# Patient Record
Sex: MALE | Race: OTHER | NOT HISPANIC OR LATINO
[De-identification: names, ages, dates, MRNs, and addresses within clinical notes are randomized per-mention and may not be internally consistent; named-entity substitution may affect disease eponyms.]

---

## 2017-06-26 PROBLEM — Z00.00 ENCOUNTER FOR PREVENTIVE HEALTH EXAMINATION: Status: ACTIVE | Noted: 2017-06-26

## 2017-07-05 ENCOUNTER — LABORATORY RESULT (OUTPATIENT)
Age: 57
End: 2017-07-05

## 2017-07-05 ENCOUNTER — APPOINTMENT (OUTPATIENT)
Dept: NEUROLOGY | Facility: CLINIC | Age: 57
End: 2017-07-05
Payer: COMMERCIAL

## 2017-07-05 VITALS
DIASTOLIC BLOOD PRESSURE: 90 MMHG | SYSTOLIC BLOOD PRESSURE: 154 MMHG | WEIGHT: 178 LBS | OXYGEN SATURATION: 97 % | TEMPERATURE: 97.6 F | BODY MASS INDEX: 24.92 KG/M2 | HEART RATE: 86 BPM | HEIGHT: 71 IN

## 2017-07-05 VITALS — DIASTOLIC BLOOD PRESSURE: 91 MMHG | SYSTOLIC BLOOD PRESSURE: 133 MMHG

## 2017-07-05 VITALS — SYSTOLIC BLOOD PRESSURE: 128 MMHG | DIASTOLIC BLOOD PRESSURE: 80 MMHG

## 2017-07-05 PROCEDURE — 99205 OFFICE O/P NEW HI 60 MIN: CPT

## 2017-07-10 ENCOUNTER — INPATIENT (INPATIENT)
Facility: HOSPITAL | Age: 57
LOS: 3 days | Discharge: HOME | DRG: 98 | End: 2017-07-14
Attending: PSYCHIATRY & NEUROLOGY | Admitting: PSYCHIATRY & NEUROLOGY
Payer: COMMERCIAL

## 2017-07-10 VITALS
HEART RATE: 82 BPM | SYSTOLIC BLOOD PRESSURE: 149 MMHG | OXYGEN SATURATION: 98 % | TEMPERATURE: 98 F | HEIGHT: 71 IN | WEIGHT: 179.68 LBS | RESPIRATION RATE: 17 BRPM | DIASTOLIC BLOOD PRESSURE: 73 MMHG

## 2017-07-10 DIAGNOSIS — G04.90 ENCEPHALITIS AND ENCEPHALOMYELITIS, UNSPECIFIED: ICD-10-CM

## 2017-07-10 DIAGNOSIS — R63.8 OTHER SYMPTOMS AND SIGNS CONCERNING FOOD AND FLUID INTAKE: ICD-10-CM

## 2017-07-10 DIAGNOSIS — S46.119A STRAIN OF MUSCLE, FASCIA AND TENDON OF LONG HEAD OF BICEPS, UNSPECIFIED ARM, INITIAL ENCOUNTER: Chronic | ICD-10-CM

## 2017-07-10 DIAGNOSIS — Z29.9 ENCOUNTER FOR PROPHYLACTIC MEASURES, UNSPECIFIED: ICD-10-CM

## 2017-07-10 DIAGNOSIS — A69.20 LYME DISEASE, UNSPECIFIED: ICD-10-CM

## 2017-07-10 PROCEDURE — 95951: CPT | Mod: 26

## 2017-07-10 PROCEDURE — 99223 1ST HOSP IP/OBS HIGH 75: CPT

## 2017-07-10 NOTE — H&P ADULT - REASON FOR ADMISSION
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

## 2017-07-10 NOTE — H&P ADULT - ASSESSMENT
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 Pt is admitted for VEEG monitoring and IV solumedrol

## 2017-07-10 NOTE — H&P ADULT - HISTORY OF PRESENT ILLNESS
55 yo M with no significant PMH admitted for VEEG monitoring and IV steroids. Pt states that he had a tick bite around february. Back then he noticed a "bull's eye rash" on his chest at the site where he removed the tick. He went to his PCP who prescribed him some penicillin. he completed the course of antibiotics. The following few months, he felt that his "psychi" was off, feeling paranoid and anxious. He did not experience any visual or auditory hallucinations.   Per outpatient record, pt felt that people were watching or following him. He had told his wife he thought someone might be coming to kill him. His PMD started him on zoloft, but 3d later, he became 'insane', much worse, with some aggressiveness. In early May, pt was admitted involuntarily to [facility], received Haldol, Seroquel and Lorazepam throughout his stay. Dystonic movements were noted by the neurologist, who sent for VGKC autoantibody titer, which came back positive at 0.19 (n<0.02).  On review of system, pt denies any headaches, changes in vision, facial paralysis, SOB, heart palpitations, muscle weakness, paresthesia, nausea, vomiting or diarrhea.     Outpatient labs from 5/7/2017 were notable for iron deficiency anemia WBC: 6.35, H/H 12.6/39.7, Plt 285; Ferritin 11.0, RDW 15.5, MCV 71.4. BMP was unremarkable 141/4.3/101/24/17/1.21/103, with mild transaminitis ST/ALT 58/196, elevated CRP (1.2) and ESR (25), negative Hep B, C and HIV panel, negative NMDA and glutamic acid carboxylase antibody, negative anti-SSa, ant-SSb antibodies for Srogjen, negative DANIELLE, normal C3 (139) and C4 (32). 57 yo M with no significant PMH admitted for VEEG monitoring and IV steroids. Pt states that he had a tick bite around february. Back then he noticed a "bull's eye rash" on his chest at the site where he removed the tick. He went to his PCP who prescribed him some penicillin. he completed the course of antibiotics. The following few months, he felt that his "psychi" was off, feeling paranoid and anxious. He did not experience any visual or auditory hallucinations.   Per outpatient record, pt felt that people were watching or following him. He had told his wife he thought someone might be coming to kill him. His PMD started him on zoloft, but 3d later, he became 'insane', much worse, with some aggressiveness. In early May, pt was admitted involuntarily to [facility], received Haldol, Seroquel and Lorazepam throughout his stay. Dystonic movements were noted by the neurologist, who sent for VGKC autoantibody titer, which came back positive at 0.19 (n<0.02).  On review of system, pt denies any headaches, changes in vision, facial paralysis, SOB, heart palpitations, muscle weakness, paresthesia, nausea, vomiting or diarrhea.     Outpatient labs from 5/7/2017 were notable for positive Lyme IgG/IgM antibodies, elevated CRP (1.2) and ESR (25), and iron deficiency anemia WBC: 6.35, H/H 12.6/39.7, Plt 285; Ferritin 11.0, RDW 15.5, MCV 71.4. BMP was unremarkable Na/K/Cl/HCO3/BUN/Crt/Glu 141/4.3/101/24/17/1.21/103, with mild transaminitis ST/ALT 58/196, negative Hep B, C and HIV panel, negative NMDA and glutamic acid carboxylase antibody, negative anti-SSa, ant-SSb antibodies for Srogjen, negative DANIELLE, normal C3 (139) and C4 (32). Thyroid function was normal TSH 1.30 with negative anti-thryoid peroxidase antibody.

## 2017-07-10 NOTE — H&P ADULT - NSHPSOCIALHISTORY_GEN_ALL_CORE
and lives with his wife. Social drinker. No current or past tobacco use. No current or past illicit drug use

## 2017-07-10 NOTE — H&P ADULT - PROBLEM SELECTOR PLAN 1
Limbic encephalitis associated with VGKC antibody.  -VEEG monitoring  -Ativan 2 mg Q4 hour PRN for seizures >2 minutes  -IV solumedrol 250 mg Q6 for 5 days

## 2017-07-10 NOTE — H&P ADULT - NSHPREVIEWOFSYSTEMS_GEN_ALL_CORE
CV: Denies chest pain, palpitations  Resp: Denies SOB  GI: Denies abdominal pain, constipation, diarrhea, nausea, vomiting  : Denies dysuria, hematuria, flank or back pain  ID: Denies fevers, chills  MSK: Denies joint pain   DERM: Denies any rashes, pruritus, bruises   PSYCH: Endorses mood changes, feelings of paranoia and anxiety.

## 2017-07-11 LAB
25(OH)D3 SERPL-MCNC: 33.6 NG/ML
A-TUMOR NECROSIS FACT SERPL-MCNC: 20 PG/ML
ADJUSTED MITOGEN: 4.08 IU/ML
ADJUSTED TB AG: 0 IU/ML
ALBUMIN MFR SERPL ELPH: 57.6 %
ALBUMIN SERPL ELPH-MCNC: 4.7 G/DL
ALBUMIN SERPL-MCNC: 4.5 G/DL
ALBUMIN/GLOB SERPL: 1.4 RATIO
ALP BLD-CCNC: 196 U/L
ALPHA1 GLOB MFR SERPL ELPH: 4.9 %
ALPHA1 GLOB SERPL ELPH-MCNC: 0.4 G/DL
ALPHA2 GLOB MFR SERPL ELPH: 10.5 %
ALPHA2 GLOB SERPL ELPH-MCNC: 0.8 G/DL
ALT SERPL-CCNC: 58 U/L
ANA SER IF-ACNC: NEGATIVE
ANION GAP SERPL CALC-SCNC: 16 MMOL/L
ANION GAP SERPL CALC-SCNC: 16 MMOL/L — SIGNIFICANT CHANGE UP (ref 5–17)
APPEARANCE CSF: CLEAR — SIGNIFICANT CHANGE UP
AST SERPL-CCNC: 30 U/L
B BURGDOR AB SER-IMP: POSITIVE
B BURGDOR IGG+IGM SER QL IB: NORMAL
B BURGDOR IGM PATRN SER IB-IMP: POSITIVE
B BURGDOR18/20KD IGM SER QL IB: NORMAL
B BURGDOR18KD IGG SER QL IB: PRESENT
B BURGDOR23KD IGG SER QL IB: PRESENT
B BURGDOR23KD IGM SER QL IB: PRESENT
B BURGDOR28KD AB SER QL IB: NORMAL
B BURGDOR28KD IGG SER QL IB: NORMAL
B BURGDOR30KD AB SER QL IB: NORMAL
B BURGDOR30KD IGG SER QL IB: NORMAL
B BURGDOR31KD IGG SER QL IB: NORMAL
B BURGDOR31KD IGM SER QL IB: NORMAL
B BURGDOR39KD IGG SER QL IB: PRESENT
B BURGDOR39KD IGM SER QL IB: PRESENT
B BURGDOR41KD IGG SER QL IB: PRESENT
B BURGDOR41KD IGM SER QL IB: PRESENT
B BURGDOR45KD AB SER QL IB: NORMAL
B BURGDOR45KD IGG SER QL IB: PRESENT
B BURGDOR58KD AB SER QL IB: NORMAL
B BURGDOR58KD IGG SER QL IB: PRESENT
B BURGDOR66KD IGG SER QL IB: NORMAL
B BURGDOR66KD IGM SER QL IB: NORMAL
B BURGDOR93KD IGG SER QL IB: PRESENT
B BURGDOR93KD IGM SER QL IB: NORMAL
B-GLOBULIN MFR SERPL ELPH: 13.9 %
B-GLOBULIN SERPL ELPH-MCNC: 1.1 G/DL
BASOPHILS # BLD AUTO: 0.03 K/UL
BASOPHILS NFR BLD AUTO: 0.5 %
BILIRUB SERPL-MCNC: 0.4 MG/DL
BUN SERPL-MCNC: 15 MG/DL — SIGNIFICANT CHANGE UP (ref 7–23)
BUN SERPL-MCNC: 17 MG/DL
C3 SERPL-MCNC: 139 MG/DL
C4 SERPL-MCNC: 32 MG/DL
CALCIUM SERPL-MCNC: 9.5 MG/DL
CALCIUM SERPL-MCNC: 9.9 MG/DL — SIGNIFICANT CHANGE UP (ref 8.4–10.5)
CHLORIDE SERPL-SCNC: 101 MMOL/L
CHLORIDE SERPL-SCNC: 97 MMOL/L — SIGNIFICANT CHANGE UP (ref 96–108)
CO2 SERPL-SCNC: 22 MMOL/L — SIGNIFICANT CHANGE UP (ref 22–31)
CO2 SERPL-SCNC: 24 MMOL/L
COLOR CSF: SIGNIFICANT CHANGE UP
CREAT SERPL-MCNC: 0.9 MG/DL — SIGNIFICANT CHANGE UP (ref 0.5–1.3)
CREAT SERPL-MCNC: 1.21 MG/DL
CRP SERPL-MCNC: 1.2 MG/DL
ENA SS-A AB SER IA-ACNC: <0.2 AL
ENA SS-B AB SER IA-ACNC: <0.2 AL
EOSINOPHIL # BLD AUTO: 0.07 K/UL
EOSINOPHIL NFR BLD AUTO: 1.1 %
ERYTHROCYTE [SEDIMENTATION RATE] IN BLOOD BY WESTERGREN METHOD: 25 MM/HR
FERRITIN SERPL-MCNC: 11 NG/ML
GAD65 AB SER-MCNC: 0 NMOL/L
GAMMA GLOB FLD ELPH-MCNC: 1 G/DL
GAMMA GLOB MFR SERPL ELPH: 13.1 %
GLUCOSE CSF-MCNC: 101 MG/DL — HIGH (ref 40–70)
GLUCOSE SERPL-MCNC: 103 MG/DL
GLUCOSE SERPL-MCNC: 208 MG/DL — HIGH (ref 70–99)
HBV CORE IGG+IGM SER QL: NONREACTIVE
HBV SURFACE AB SER QL: NONREACTIVE
HCT VFR BLD CALC: 39.7 %
HCT VFR BLD CALC: 39.8 % — SIGNIFICANT CHANGE UP (ref 39–50)
HCV AB SER QL: NONREACTIVE
HCV S/CO RATIO: 0.31 S/CO
HGB BLD-MCNC: 12.6 G/DL
HGB BLD-MCNC: 12.7 G/DL — LOW (ref 13–17)
HIV1+2 AB SPEC QL IA.RAPID: NONREACTIVE
IL10 SERPL-MCNC: 12 PG/ML
IL12 SERPL-MCNC: 6 PG/ML
IL13 SERPL-MCNC: 30 PG/ML
IL2 SERPL-MCNC: 706 PG/ML
IL2 SERPL-MCNC: 8 PG/ML
IL4 SERPL-MCNC: <5 PG/ML
IL6 SERPL-MCNC: <5 PG/ML
IL8 SERPL-MCNC: <5 PG/ML
IMM GRANULOCYTES NFR BLD AUTO: 0.3 %
INTERFERON GAMMA: 8 PG/ML
INTERLEUKIN 1 BETA: 9 PG/ML
INTERLEUKIN 17: <5 PG/ML
INTERLEUKIN 5: <5 PG/ML
INTERPRETATION SERPL IEP-IMP: NORMAL
JC VIRUS DNA, WHOLE BLOOD, PCR: NEGATIVE
LYMPHOCYTES # BLD AUTO: 1.12 K/UL
LYMPHOCYTES # CSF: 8 % — LOW (ref 40–80)
LYMPHOCYTES NFR BLD AUTO: 17.6 %
M PROTEIN SPEC IFE-MCNC: NORMAL
M TB IFN-G BLD-IMP: NORMAL
MAGNESIUM SERPL-MCNC: 2.1 MG/DL — SIGNIFICANT CHANGE UP (ref 1.6–2.6)
MAN DIFF?: NORMAL
MCHC RBC-ENTMCNC: 22.1 PG — LOW (ref 27–34)
MCHC RBC-ENTMCNC: 22.7 PG
MCHC RBC-ENTMCNC: 31.7 GM/DL
MCHC RBC-ENTMCNC: 31.9 G/DL — LOW (ref 32–36)
MCV RBC AUTO: 69.2 FL — LOW (ref 80–100)
MCV RBC AUTO: 71.4 FL
MONOCYTES # BLD AUTO: 0.57 K/UL
MONOCYTES NFR BLD AUTO: 9 %
MONOS+MACROS NFR CSF: 1 % — LOW (ref 15–45)
NEUTROPHILS # BLD AUTO: 4.54 K/UL
NEUTROPHILS # CSF: 0 % — SIGNIFICANT CHANGE UP (ref 0–6)
NEUTROPHILS NFR BLD AUTO: 71.5 %
NMDA RECEPTOR AB N-METHYL-D-ASPARTATE RECEPTOR AB IGG, SERUM WITH REFLEX TO TITER: NORMAL
NRBC NFR CSF: 9 /UL — HIGH (ref 0–5)
PLATELET # BLD AUTO: 285 K/UL
PLATELET # BLD AUTO: 402 K/UL — HIGH (ref 150–400)
POTASSIUM SERPL-MCNC: 4.3 MMOL/L — SIGNIFICANT CHANGE UP (ref 3.5–5.3)
POTASSIUM SERPL-SCNC: 4.3 MMOL/L
POTASSIUM SERPL-SCNC: 4.3 MMOL/L — SIGNIFICANT CHANGE UP (ref 3.5–5.3)
PROT CSF-MCNC: 48 MG/DL — HIGH (ref 15–45)
PROT SERPL-MCNC: 7.8 G/DL
QUANTIFERON GOLD NIL: 0.02 IU/ML
RBC # BLD: 5.56 M/UL
RBC # BLD: 5.75 M/UL — SIGNIFICANT CHANGE UP (ref 4.2–5.8)
RBC # CSF: 0 /UL — SIGNIFICANT CHANGE UP (ref 0–0)
RBC # FLD: 15.1 % — SIGNIFICANT CHANGE UP (ref 10.3–16.9)
RBC # FLD: 15.5 %
SODIUM SERPL-SCNC: 135 MMOL/L — SIGNIFICANT CHANGE UP (ref 135–145)
SODIUM SERPL-SCNC: 141 MMOL/L
THYROGLOB AB SERPL-ACNC: <20 IU/ML
THYROPEROXIDASE AB SERPL IA-ACNC: <10 IU/ML
TSH SERPL-ACNC: 1.3 UIU/ML
TUBE TYPE: SIGNIFICANT CHANGE UP
WBC # BLD: 4 K/UL — SIGNIFICANT CHANGE UP (ref 3.8–10.5)
WBC # FLD AUTO: 4 K/UL — SIGNIFICANT CHANGE UP (ref 3.8–10.5)
WBC # FLD AUTO: 6.35 K/UL

## 2017-07-11 PROCEDURE — 96118: CPT

## 2017-07-11 PROCEDURE — 90791 PSYCH DIAGNOSTIC EVALUATION: CPT

## 2017-07-11 PROCEDURE — 95951: CPT | Mod: 26,52

## 2017-07-11 PROCEDURE — 62270 DX LMBR SPI PNXR: CPT | Mod: GC

## 2017-07-11 PROCEDURE — 99232 SBSQ HOSP IP/OBS MODERATE 35: CPT

## 2017-07-11 RX ORDER — DEXTROSE 50 % IN WATER 50 %
25 SYRINGE (ML) INTRAVENOUS ONCE
Qty: 0 | Refills: 0 | Status: DISCONTINUED | OUTPATIENT
Start: 2017-07-11 | End: 2017-07-14

## 2017-07-11 RX ORDER — SODIUM CHLORIDE 9 MG/ML
1000 INJECTION, SOLUTION INTRAVENOUS
Qty: 0 | Refills: 0 | Status: DISCONTINUED | OUTPATIENT
Start: 2017-07-11 | End: 2017-07-14

## 2017-07-11 RX ORDER — FAMOTIDINE 10 MG/ML
20 INJECTION INTRAVENOUS DAILY
Qty: 0 | Refills: 0 | Status: DISCONTINUED | OUTPATIENT
Start: 2017-07-11 | End: 2017-07-11

## 2017-07-11 RX ORDER — GLUCAGON INJECTION, SOLUTION 0.5 MG/.1ML
1 INJECTION, SOLUTION SUBCUTANEOUS ONCE
Qty: 0 | Refills: 0 | Status: DISCONTINUED | OUTPATIENT
Start: 2017-07-11 | End: 2017-07-14

## 2017-07-11 RX ORDER — DEXTROSE 50 % IN WATER 50 %
12.5 SYRINGE (ML) INTRAVENOUS ONCE
Qty: 0 | Refills: 0 | Status: DISCONTINUED | OUTPATIENT
Start: 2017-07-11 | End: 2017-07-14

## 2017-07-11 RX ORDER — DEXTROSE 50 % IN WATER 50 %
1 SYRINGE (ML) INTRAVENOUS ONCE
Qty: 0 | Refills: 0 | Status: DISCONTINUED | OUTPATIENT
Start: 2017-07-11 | End: 2017-07-14

## 2017-07-11 RX ORDER — INSULIN LISPRO 100/ML
VIAL (ML) SUBCUTANEOUS
Qty: 0 | Refills: 0 | Status: DISCONTINUED | OUTPATIENT
Start: 2017-07-11 | End: 2017-07-14

## 2017-07-11 RX ORDER — PANTOPRAZOLE SODIUM 20 MG/1
40 TABLET, DELAYED RELEASE ORAL
Qty: 0 | Refills: 0 | Status: DISCONTINUED | OUTPATIENT
Start: 2017-07-11 | End: 2017-07-14

## 2017-07-11 RX ORDER — LANOLIN ALCOHOL/MO/W.PET/CERES
3 CREAM (GRAM) TOPICAL ONCE
Qty: 0 | Refills: 0 | Status: COMPLETED | OUTPATIENT
Start: 2017-07-11 | End: 2017-07-11

## 2017-07-11 RX ADMIN — Medication 50 MILLIGRAM(S): at 19:06

## 2017-07-11 RX ADMIN — PANTOPRAZOLE SODIUM 40 MILLIGRAM(S): 20 TABLET, DELAYED RELEASE ORAL at 13:04

## 2017-07-11 RX ADMIN — Medication 50 MILLIGRAM(S): at 06:17

## 2017-07-11 RX ADMIN — Medication 100 MILLIGRAM(S): at 19:06

## 2017-07-11 RX ADMIN — Medication 50 MILLIGRAM(S): at 23:33

## 2017-07-11 RX ADMIN — Medication 2: at 23:32

## 2017-07-11 RX ADMIN — Medication 4: at 19:05

## 2017-07-11 RX ADMIN — Medication 3 MILLIGRAM(S): at 23:33

## 2017-07-11 RX ADMIN — Medication 100 MILLIGRAM(S): at 06:17

## 2017-07-11 RX ADMIN — Medication 2: at 13:04

## 2017-07-11 RX ADMIN — Medication 50 MILLIGRAM(S): at 00:10

## 2017-07-11 RX ADMIN — Medication 50 MILLIGRAM(S): at 13:04

## 2017-07-11 NOTE — PROGRESS NOTE ADULT - SUBJECTIVE AND OBJECTIVE BOX
INTERVAL HPI/OVERNIGHT EVENTS:  Patient S&E at bedside. No overnight events, patient resting comfortably. No complaints at this time. Patient denies fever, chills, dizziness, weakness, CP, palpitations, SOB, cough, N/V/D/C, dysuria, changes in bowel movements, LE edema. Denies anxiety, paranoia or other psychiatric complaints currently or in the past several weeks.    VITAL SIGNS:  T(F): 98.1 (07-11-17 @ 05:20)  HR: 63 (07-11-17 @ 05:20)  BP: 126/70 (07-11-17 @ 05:20)  RR: 15 (07-11-17 @ 05:20)  SpO2: 99% (07-11-17 @ 05:20)    PHYSICAL EXAM:    Constitutional: WDWN, NAD  Eyes: PERRL, EOMI, sclera non-icteric  Neck: supple, no masses, no JVD  Respiratory: CTA b/l, good air entry b/l, no wheezing, rhonchi, rales, with normal respiratory effort and no intercostal retractions  Cardiovascular: RRR, normal S1S2, no M/R/G  Gastrointestinal: soft, NTND, no masses palpable, BS normal in all four quadrants, no HSM  Extremities: WWP  Neurological: AAOx3  Skin: Normal temperature    MEDICATIONS  (STANDING):  doxycycline hyclate Capsule 100 milliGRAM(s) Oral every 12 hours  methylPREDNISolone sodium succinate IVPB 250 milliGRAM(s) IV Intermittent every 6 hours  melatonin 3 milliGRAM(s) Oral once  insulin lispro (HumaLOG) corrective regimen sliding scale   SubCutaneous Before meals and at bedtime  dextrose 5%. 1000 milliLiter(s) (50 mL/Hr) IV Continuous <Continuous>  dextrose 50% Injectable 12.5 Gram(s) IV Push once  dextrose 50% Injectable 25 Gram(s) IV Push once  dextrose 50% Injectable 25 Gram(s) IV Push once  pantoprazole    Tablet 40 milliGRAM(s) Oral before breakfast    MEDICATIONS  (PRN):  LORazepam    IVPB 2 milliGRAM(s) IV Intermittent every 4 hours PRN Seizure  dextrose Gel 1 Dose(s) Oral once PRN Blood Glucose LESS THAN 70 milliGRAM(s)/deciliter  glucagon  Injectable 1 milliGRAM(s) IntraMuscular once PRN Glucose LESS THAN 70 milligrams/deciliter      Allergies    No Known Allergies    Intolerances        LABS:       RADIOLOGY & ADDITIONAL TESTS:  Studies reviewed. INTERVAL HPI/OVERNIGHT EVENTS:  Patient S&E at bedside. No overnight events, patient resting comfortably. No complaints at this time. Patient denies fever, chills, dizziness, weakness, CP, palpitations, SOB, cough, N/V/D/C, dysuria, changes in bowel movements, LE edema. Denies anxiety, paranoia or other psychiatric complaints currently or in the past several weeks.    VITAL SIGNS:  T(F): 98.1 (07-11-17 @ 05:20)  HR: 63 (07-11-17 @ 05:20)  BP: 126/70 (07-11-17 @ 05:20)  RR: 15 (07-11-17 @ 05:20)  SpO2: 99% (07-11-17 @ 05:20)    PHYSICAL EXAM:    Constitutional: WDWN, NAD  Eyes: PERRL, EOMI, sclera non-icteric  Neck: supple, no masses, no JVD  Respiratory: CTA b/l, good air entry b/l, no wheezing, rhonchi, rales, with normal respiratory effort and no intercostal retractions  Cardiovascular: RRR, normal S1S2, no M/R/G  Gastrointestinal: soft, NTND, no masses palpable, BS normal in all four quadrants, no HSM  Extremities: WWP  Skin: Normal temperature  Neurological:  AAOx3, speech fluent, no dysarthria, naming and repetition intact, follows 3 step commands  PERRL, EOMI, VFFC, face symmetric, tongue midline  5/5 x 4  LT intact b/l  DTR symmetric  FTN intact b/l      MEDICATIONS  (STANDING):  doxycycline hyclate Capsule 100 milliGRAM(s) Oral every 12 hours  methylPREDNISolone sodium succinate IVPB 250 milliGRAM(s) IV Intermittent every 6 hours  melatonin 3 milliGRAM(s) Oral once  insulin lispro (HumaLOG) corrective regimen sliding scale   SubCutaneous Before meals and at bedtime  dextrose 5%. 1000 milliLiter(s) (50 mL/Hr) IV Continuous <Continuous>  dextrose 50% Injectable 12.5 Gram(s) IV Push once  dextrose 50% Injectable 25 Gram(s) IV Push once  dextrose 50% Injectable 25 Gram(s) IV Push once  pantoprazole    Tablet 40 milliGRAM(s) Oral before breakfast    MEDICATIONS  (PRN):  LORazepam    IVPB 2 milliGRAM(s) IV Intermittent every 4 hours PRN Seizure  dextrose Gel 1 Dose(s) Oral once PRN Blood Glucose LESS THAN 70 milliGRAM(s)/deciliter  glucagon  Injectable 1 milliGRAM(s) IntraMuscular once PRN Glucose LESS THAN 70 milligrams/deciliter      Allergies    No Known Allergies    Intolerances        VEEG (Prelim) normal wakefulness, drowsiness and sleep.      RADIOLOGY & ADDITIONAL TESTS:  Studies reviewed.

## 2017-07-11 NOTE — PROGRESS NOTE ADULT - ASSESSMENT
55 yo male with no significant PMH found to have anti-VGKC antibodies and positive lyme serology at outpatient neurologist during workup for new onset psychiatric disturbances, admitted for VEEG monitoring and IV solumedrol. 57 yo male with no significant PMH found to have anti-VGKC antibodies and positive lyme serology at outpatient neurologist during workup for new onset psychiatric disturbances, admitted for VEEG monitoring to r/o subclinical seizures and IV solumedrol for treatment of presumed encephalitis.

## 2017-07-11 NOTE — CONSULT NOTE ADULT - SUBJECTIVE AND OBJECTIVE BOX
The patient was seen at bedside.  I conducted a clinical interview and reviewed all available medical records.  A portion of the neuropsychological testing was conducted.  The patient was alert and cooperative.   A full report with clinical recommendations will follow as a scanned note once testing is completed.   1 hour clinical interview, clinical assessment of the patient, collateral interview, test selection,   2 hours face-to-face testing  2 hours detailed review and analysis of all test data, integration of information obtained from other sources, formulation and generation of report

## 2017-07-11 NOTE — PROGRESS NOTE ADULT - PROBLEM SELECTOR PLAN 1
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  -VEEG monitoring per Dr. Dupree (Neurology)  -Ativan 2 mg Q4 hour PRN for seizures >2 minutes  -IV solumedrol 250 mg Q6 for 5 days, on day 1/5  -ISS given steroid injections Limbic encephalitis associated with positive VGKC antibody.  -VEEG monitoring per Dr. Dupree (Neurology)  -Ativan 2 mg IVP PRN for seizures >2 minutes  -IV solumedrol 250 mg Q6 for 5 days, on day 2/5  -ISS given steroid injections  - GI ppx  - LP today to evaluate for evidence of active CNS inflammation/check VGKC titers: Protein, glucose, cell count, oligoclonal bands, IgG index, paraneoplastic panel, VGKC ab  - Check serum oligoclonal bands today as well.

## 2017-07-12 LAB
ANION GAP SERPL CALC-SCNC: 15 MMOL/L — SIGNIFICANT CHANGE UP (ref 5–17)
BUN SERPL-MCNC: 17 MG/DL — SIGNIFICANT CHANGE UP (ref 7–23)
CALCIUM SERPL-MCNC: 9.7 MG/DL — SIGNIFICANT CHANGE UP (ref 8.4–10.5)
CHLORIDE SERPL-SCNC: 99 MMOL/L — SIGNIFICANT CHANGE UP (ref 96–108)
CO2 SERPL-SCNC: 23 MMOL/L — SIGNIFICANT CHANGE UP (ref 22–31)
CREAT SERPL-MCNC: 0.9 MG/DL — SIGNIFICANT CHANGE UP (ref 0.5–1.3)
GLUCOSE SERPL-MCNC: 163 MG/DL — HIGH (ref 70–99)
HCT VFR BLD CALC: 38 % — LOW (ref 39–50)
HGB BLD-MCNC: 12.4 G/DL — LOW (ref 13–17)
MAGNESIUM SERPL-MCNC: 2.3 MG/DL — SIGNIFICANT CHANGE UP (ref 1.6–2.6)
MCHC RBC-ENTMCNC: 22.6 PG — LOW (ref 27–34)
MCHC RBC-ENTMCNC: 32.6 G/DL — SIGNIFICANT CHANGE UP (ref 32–36)
MCV RBC AUTO: 69.3 FL — LOW (ref 80–100)
PLATELET # BLD AUTO: 442 K/UL — HIGH (ref 150–400)
POTASSIUM SERPL-MCNC: 4.3 MMOL/L — SIGNIFICANT CHANGE UP (ref 3.5–5.3)
POTASSIUM SERPL-SCNC: 4.3 MMOL/L — SIGNIFICANT CHANGE UP (ref 3.5–5.3)
RBC # BLD: 5.48 M/UL — SIGNIFICANT CHANGE UP (ref 4.2–5.8)
RBC # FLD: 15.2 % — SIGNIFICANT CHANGE UP (ref 10.3–16.9)
SODIUM SERPL-SCNC: 137 MMOL/L — SIGNIFICANT CHANGE UP (ref 135–145)
WBC # BLD: 11.6 K/UL — HIGH (ref 3.8–10.5)
WBC # FLD AUTO: 11.6 K/UL — HIGH (ref 3.8–10.5)

## 2017-07-12 PROCEDURE — 99232 SBSQ HOSP IP/OBS MODERATE 35: CPT

## 2017-07-12 RX ORDER — ACETAMINOPHEN 500 MG
650 TABLET ORAL EVERY 6 HOURS
Qty: 0 | Refills: 0 | Status: DISCONTINUED | OUTPATIENT
Start: 2017-07-12 | End: 2017-07-14

## 2017-07-12 RX ADMIN — Medication 2: at 21:14

## 2017-07-12 RX ADMIN — Medication 650 MILLIGRAM(S): at 08:56

## 2017-07-12 RX ADMIN — Medication 50 MILLIGRAM(S): at 17:53

## 2017-07-12 RX ADMIN — Medication 100 MILLIGRAM(S): at 17:53

## 2017-07-12 RX ADMIN — Medication 100 MILLIGRAM(S): at 06:53

## 2017-07-12 RX ADMIN — Medication 50 MILLIGRAM(S): at 13:07

## 2017-07-12 RX ADMIN — Medication 2: at 17:53

## 2017-07-12 RX ADMIN — PANTOPRAZOLE SODIUM 40 MILLIGRAM(S): 20 TABLET, DELAYED RELEASE ORAL at 06:53

## 2017-07-12 RX ADMIN — Medication 50 MILLIGRAM(S): at 06:53

## 2017-07-12 RX ADMIN — Medication 2: at 08:51

## 2017-07-12 NOTE — PROGRESS NOTE ADULT - SUBJECTIVE AND OBJECTIVE BOX
INTERVAL HPI/OVERNIGHT EVENTS:  Patient S&E at bedside. No overnight events, patient resting comfortably. Reported mild headache after lumbar puncture yesterday which resolved, but complains of mild headache this morning which he thinks is 2/2 EEG leads -- received tylenol x1. Patient denies fever, chills, dizziness, weakness, CP, palpitations, SOB, cough, N/V/D/C, dysuria, changes in bowel movements, LE edema, neurologic symptoms (numbness, tingling, weakness).    VITAL SIGNS:  T(F): 98.1 (07-11-17 @ 05:20)  HR: 63 (07-11-17 @ 05:20)  BP: 126/70 (07-11-17 @ 05:20)  RR: 15 (07-11-17 @ 05:20)  SpO2: 99% (07-11-17 @ 05:20)    PHYSICAL EXAM:    Constitutional: WDWN, NAD  Eyes: PERRL, EOMI, sclera non-icteric  Neck: supple  Respiratory: CTA b/l, good air entry b/l, no wheezing, rhonchi, rales, with normal respiratory effort and no intercostal retractions  Cardiovascular: RRR, normal S1S2, no M/R/G  Gastrointestinal: soft, NTND, no masses palpable, BS normal in all four quadrants, no HSM  Extremities: WWP  Skin: Normal temperature  Neurological:  AAOx3, speech fluent, no dysarthria, naming and repetition intact, follows 3 step commands  PERRL, EOMI, VFFC, face symmetric, tongue midline  5/5 x 4  LT intact b/l  DTR symmetric  FTN intact b/l      MEDICATIONS  (STANDING):  doxycycline hyclate Capsule 100 milliGRAM(s) Oral every 12 hours  methylPREDNISolone sodium succinate IVPB 250 milliGRAM(s) IV Intermittent every 6 hours  melatonin 3 milliGRAM(s) Oral once  insulin lispro (HumaLOG) corrective regimen sliding scale   SubCutaneous Before meals and at bedtime  dextrose 5%. 1000 milliLiter(s) (50 mL/Hr) IV Continuous <Continuous>  dextrose 50% Injectable 12.5 Gram(s) IV Push once  dextrose 50% Injectable 25 Gram(s) IV Push once  dextrose 50% Injectable 25 Gram(s) IV Push once  pantoprazole    Tablet 40 milliGRAM(s) Oral before breakfast    MEDICATIONS  (PRN):  LORazepam    IVPB 2 milliGRAM(s) IV Intermittent every 4 hours PRN Seizure  dextrose Gel 1 Dose(s) Oral once PRN Blood Glucose LESS THAN 70 milliGRAM(s)/deciliter  glucagon  Injectable 1 milliGRAM(s) IntraMuscular once PRN Glucose LESS THAN 70 milligrams/deciliter      Allergies    No Known Allergies    Intolerances      VEEG (Prelim) normal wakefulness, drowsiness and sleep.      RADIOLOGY & ADDITIONAL TESTS:  Studies reviewed. INTERVAL HPI/OVERNIGHT EVENTS:  Patient S&E at bedside. No overnight events, patient resting comfortably. Reported mild headache after lumbar puncture yesterday which resolved, but complains of mild headache this morning which he thinks is 2/2 EEG leads -- received tylenol x1 with resolution. Patient denies fever, chills, dizziness, weakness, CP, palpitations, SOB, cough, N/V/D/C, dysuria, changes in bowel movements, LE edema, neurologic symptoms (numbness, tingling, weakness). Has been OOB.    VITAL SIGNS:  T(F): 98.1 (07-11-17 @ 05:20)  HR: 63 (07-11-17 @ 05:20)  BP: 126/70 (07-11-17 @ 05:20)  RR: 15 (07-11-17 @ 05:20)  SpO2: 99% (07-11-17 @ 05:20)    PHYSICAL EXAM:    Constitutional: WDWN, NAD  Eyes: PERRL, EOMI, sclera non-icteric  Neck: supple  Respiratory: CTA b/l, good air entry b/l, no wheezing, rhonchi, rales, with normal respiratory effort and no intercostal retractions  Cardiovascular: RRR, normal S1S2, no M/R/G  Gastrointestinal: soft, NTND, no masses palpable, BS normal in all four quadrants, no HSM  Extremities: WWP  Skin: Normal temperature  Neurological:  AAOx3, speech fluent, no dysarthria, naming and repetition intact, follows 3 step commands  PERRL, EOMI, VFFC, face symmetric, tongue midline  5/5 x 4  LT intact b/l  DTR symmetric  FTN intact b/l      MEDICATIONS  (STANDING):  doxycycline hyclate Capsule 100 milliGRAM(s) Oral every 12 hours  methylPREDNISolone sodium succinate IVPB 250 milliGRAM(s) IV Intermittent every 6 hours  melatonin 3 milliGRAM(s) Oral once  insulin lispro (HumaLOG) corrective regimen sliding scale   SubCutaneous Before meals and at bedtime  dextrose 5%. 1000 milliLiter(s) (50 mL/Hr) IV Continuous <Continuous>  dextrose 50% Injectable 12.5 Gram(s) IV Push once  dextrose 50% Injectable 25 Gram(s) IV Push once  dextrose 50% Injectable 25 Gram(s) IV Push once  pantoprazole    Tablet 40 milliGRAM(s) Oral before breakfast    MEDICATIONS  (PRN):  LORazepam    IVPB 2 milliGRAM(s) IV Intermittent every 4 hours PRN Seizure  dextrose Gel 1 Dose(s) Oral once PRN Blood Glucose LESS THAN 70 milliGRAM(s)/deciliter  glucagon  Injectable 1 milliGRAM(s) IntraMuscular once PRN Glucose LESS THAN 70 milligrams/deciliter      Allergies    No Known Allergies    Intolerances      VEEG (Prelim) normal wakefulness, drowsiness and sleep.      RADIOLOGY & ADDITIONAL TESTS:  Studies reviewed. INTERVAL HPI/OVERNIGHT EVENTS:  Patient S&E at bedside. No overnight events, patient resting comfortably. Reported mild headache after lumbar puncture yesterday which resolved, but complains of mild headache this morning which he thinks is 2/2 EEG leads -- received tylenol x1 with resolution. Patient denies fever, chills, dizziness, weakness, CP, palpitations, SOB, cough, N/V/D/C, dysuria, changes in bowel movements, LE edema, neurologic symptoms (numbness, tingling, weakness). Has been OOB.    VITAL SIGNS:  T(F): 98.1 (07-11-17 @ 05:20)  HR: 63 (07-11-17 @ 05:20)  BP: 126/70 (07-11-17 @ 05:20)  RR: 15 (07-11-17 @ 05:20)  SpO2: 99% (07-11-17 @ 05:20)    PHYSICAL EXAM:    Constitutional: WDWN, NAD  Eyes: PERRL, EOMI, sclera non-icteric  Neck: supple  Respiratory: CTA b/l, good air entry b/l, no wheezing, rhonchi, rales, with normal respiratory effort and no intercostal retractions  Cardiovascular: RRR, normal S1S2, no M/R/G  Gastrointestinal: soft, NTND, no masses palpable, BS normal in all four quadrants, no HSM  Extremities: WWP  Skin: Normal temperature  Neurological:  AAOx3, speech fluent, no dysarthria, naming and repetition intact, follows 3 step commands  PERRL, EOMI, VFFC, face symmetric, tongue midline  5/5 x 4  LT intact b/l  DTR symmetric  FTN intact b/l      MEDICATIONS  (STANDING):  doxycycline hyclate Capsule 100 milliGRAM(s) Oral every 12 hours  methylPREDNISolone sodium succinate IVPB 250 milliGRAM(s) IV Intermittent every 6 hours  melatonin 3 milliGRAM(s) Oral once  insulin lispro (HumaLOG) corrective regimen sliding scale   SubCutaneous Before meals and at bedtime  dextrose 5%. 1000 milliLiter(s) (50 mL/Hr) IV Continuous <Continuous>  dextrose 50% Injectable 12.5 Gram(s) IV Push once  dextrose 50% Injectable 25 Gram(s) IV Push once  dextrose 50% Injectable 25 Gram(s) IV Push once  pantoprazole    Tablet 40 milliGRAM(s) Oral before breakfast    MEDICATIONS  (PRN):  LORazepam    IVPB 2 milliGRAM(s) IV Intermittent every 4 hours PRN Seizure  dextrose Gel 1 Dose(s) Oral once PRN Blood Glucose LESS THAN 70 milliGRAM(s)/deciliter  glucagon  Injectable 1 milliGRAM(s) IntraMuscular once PRN Glucose LESS THAN 70 milligrams/deciliter      Allergies    No Known Allergies    Intolerances      VEEG (Prelim) normal wakefulness, drowsiness and sleep.      RADIOLOGY & ADDITIONAL TESTS:  Studies reviewed.    VEEG: normal. no seizures, clinical events or epileptiform discharges.

## 2017-07-12 NOTE — PROGRESS NOTE ADULT - PROBLEM SELECTOR PLAN 1
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  -VEEG monitoring per Dr. Dupree (Neurology)  -Ativan 2 mg IVP PRN for seizures >2 minutes  -IV solumedrol 250 mg Q6 for 5 days, on day 3/5  -ISS given steroid injections  -GI ppx  -f/u results of LP (VGKC titers, Protein, glucose, cell count, oligoclonal bands, IgG index, paraneoplastic panel, VGKC ab)  -f/u serum oligoclonal bands Limbic encephalitis associated with positive VGKC antibody.  -VEEG monitoring per Dr. Dupree (Neurology)  -Ativan 2 mg IVP PRN for seizures >2 minutes  -IV solumedrol 250 mg Q6 for 5 days, on day 2/5  -ISS given steroid injections  -GI ppx  -f/u results of LP (VGKC titers, Protein, glucose, cell count, oligoclonal bands, IgG index, paraneoplastic panel, VGKC ab)  -f/u serum oligoclonal bands Limbic encephalitis associated with positive VGKC antibody.  -VEEG monitoring to be disconnected today- resulting in intermittent agitation and study thus far normal.  -Ativan 2 mg IVP PRN for seizures >2 minutes  -IV solumedrol 250 mg Q6 for 5 days, on day 2/5.   -ISS given steroid injections  -GI ppx  -f/u results of LP (VGKC titers,  oligoclonal bands, IgG index, paraneoplastic panel)  -f/u serum oligoclonal bands

## 2017-07-12 NOTE — PROGRESS NOTE ADULT - ASSESSMENT
57 yo male with no significant PMH found to have anti-VGKC antibodies and positive lyme serology at outpatient neurologist during workup for new onset psychiatric disturbances, admitted for VEEG monitoring to r/o subclinical seizures and IV solumedrol for treatment of presumed encephalitis.

## 2017-07-13 ENCOUNTER — TRANSCRIPTION ENCOUNTER (OUTPATIENT)
Age: 57
End: 2017-07-13

## 2017-07-13 LAB
ALBUMIN CSF-MCNC: 21.7 MG/DL — SIGNIFICANT CHANGE UP (ref 14–25)
ALBUMIN SERPL ELPH-MCNC: 4250 MG/DL — SIGNIFICANT CHANGE UP (ref 3500–5200)
IGG CSF-MCNC: 2.4 MG/DL — SIGNIFICANT CHANGE UP
IGG FLD-MCNC: 1040 MG/DL — SIGNIFICANT CHANGE UP (ref 694–1618)
IGG SYNTH RATE SER+CSF CALC-MRATE: -3.8 MG/DAY — SIGNIFICANT CHANGE UP
IGG/ALB CLEAR SER+CSF-RTO: 0.5 — SIGNIFICANT CHANGE UP
IGG/ALB CSF: 0.11 RATIO — SIGNIFICANT CHANGE UP
IGG/ALB SER: 0.24 RATIO — SIGNIFICANT CHANGE UP

## 2017-07-13 PROCEDURE — 99232 SBSQ HOSP IP/OBS MODERATE 35: CPT

## 2017-07-13 RX ADMIN — Medication 100 MILLIGRAM(S): at 06:01

## 2017-07-13 RX ADMIN — Medication 50 MILLIGRAM(S): at 06:01

## 2017-07-13 RX ADMIN — Medication 50 MILLIGRAM(S): at 17:28

## 2017-07-13 RX ADMIN — Medication 2: at 08:59

## 2017-07-13 RX ADMIN — Medication 50 MILLIGRAM(S): at 23:06

## 2017-07-13 RX ADMIN — Medication 100 MILLIGRAM(S): at 17:28

## 2017-07-13 RX ADMIN — Medication 2: at 12:19

## 2017-07-13 RX ADMIN — Medication 50 MILLIGRAM(S): at 00:03

## 2017-07-13 RX ADMIN — PANTOPRAZOLE SODIUM 40 MILLIGRAM(S): 20 TABLET, DELAYED RELEASE ORAL at 06:01

## 2017-07-13 RX ADMIN — Medication 50 MILLIGRAM(S): at 12:20

## 2017-07-13 RX ADMIN — Medication 650 MILLIGRAM(S): at 16:27

## 2017-07-13 NOTE — DISCHARGE NOTE ADULT - CARE PROVIDER_API CALL
Pablo Dupree), Clinical Neurophysiology; Neurology; Sleep Medicine  [address] [address]  Phone: [phone]  Fax: [phone]    Sergey Alva  [address] [place] [address] [address] [place] [address]  Phone: [phone]  Fax: (   )    -

## 2017-07-13 NOTE — PROGRESS NOTE ADULT - PROBLEM SELECTOR PLAN 2
-c/w doxycycline 100 mg BID for total of 21 days, pt on week 1 of treatment
-c/w doxycycline 100 mg BID for total of 21 days, pt on week 1 of treatment (started on 7/6)
-c/w doxycycline 100 mg BID for total of 21 days, pt on week 1 of treatment (started on 7/6)

## 2017-07-13 NOTE — DISCHARGE NOTE ADULT - FINDINGS/TREATMENT
You had a diagnostic lumbar puncture during your hospitalization. The results of this procedure will be discussed with you at your follow up appointment with Dr. Dupree.

## 2017-07-13 NOTE — DISCHARGE NOTE ADULT - HOSPITAL COURSE
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 48 hour video EEG monitoring was normal. The patient received 4 days of Solumedrol prior to discharge and was started on a prednisone taper to continue to take at home. During his hospitalization he also had a diagnostic lumbar puncture done without complications, the results of which will be discussed with the patient upon follow up with Neurology in one month. He was noted to have a mild microcytic anemia on routine lab work, for which he was instructed to follow up with his PCP. The patient was medically optimized, stable, and ready for discharge. Plan of care and return precautions were discussed with the patient who verbally stated understanding. Patient was advised to continue on a steroid taper and to follow up with Dr. Dupree on August 18th at 1PM as well as with his primary care provide Dr. Alva at a time of his choosing. 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 48-72 hour video EEG monitoring was normal. The patient received 4 days of Solumedrol prior to discharge and was started on a prednisone taper to continue to take at home. During his hospitalization he also had a diagnostic lumbar puncture done without complications, the results of which will be discussed with the patient upon follow up with Neurology in one month. He was noted to have a mild microcytic anemia on routine lab work, for which he was instructed to follow up with his PCP. The patient was medically optimized, stable, and ready for discharge. Plan of care and return precautions were discussed with the patient who verbally stated understanding. Patient was advised to continue on a steroid taper and to follow up with Dr. Dupree on August 18th at 1PM as well as with his primary care provide Dr. Alva at a time of his choosing. 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 48-hour video EEG monitoring was normal. The patient received 4 days of Solumedrol (1gm IVPB daily)prior to discharge and was started on a prednisone taper to be continued upon discharge. During his hospitalization he also had a diagnostic lumbar puncture done without complications, the results of which will be discussed with the patient upon follow up with Neurology in one month (preliminary results negative for active inflammation- oligoclonal bands, paraneoplastic panel and VGKC ab are pending). He was noted to have a mild microcytic anemia on routine lab work, for which he was instructed to follow up with his PCP. The patient was medically optimized, stable, and ready for discharge. Plan of care and return precautions were discussed with the patient who verbally stated understanding. Patient was advised to continue on a steroid taper and to follow up with Dr. Dupree on August 18th at 1PM as well as with his primary care provide Dr. Alva at a time of his choosing.

## 2017-07-13 NOTE — DISCHARGE NOTE ADULT - PROVIDER TOKENS
TOKEN:'[id number]',FREE:[LAST:[Rana],FIRST:[Sergey],PHONE:[phone]],FAX:[phone]    -],ADDRESS:[[address] [place] [address] [address] [id number]]]

## 2017-07-13 NOTE — DISCHARGE NOTE ADULT - PLAN OF CARE
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 You were admitted to the hospital for treatment of the encephalitis including a course of IV Solumedrol, as well as further work up of the origin including a lumbar puncture and video EEG monitoring. You are being discharged on a taper regimen of prednisone, please take this medication as prescribed. Additionally, please follow up with Dr. Pablo Dupree (Neurology) on August 18th at 1PM as well as your primary care provider at a time of your choosing. Continued workup and treatment You have a recent diagnosis of Lyme Disease which had not fully resolved at the time of your admission. You were continued on Doxycycline   100mg twice a day. Please continue to take this medication for a total of 21 days. You were found to have a mild anemia with small red blood cells on routine blood work during your hospitalization. Please follow up with your primary care provider for further work up of this condition. 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 You were admitted to the hospital for treatment of the encephalitis including a course of IV Solumedrol, as well as further work up of the origin including a lumbar puncture and video EEG monitoring in addition to neuro-psychological testing. You are being discharged on a taper regimen of prednisone, please take this medication as prescribed. Additionally, please follow up with Dr. Pablo Dupree (Neurology) on August 18th at 1PM as well as your primary care provider at a time of your choosing to address your anemia. You were found to have a mild anemia with small red blood cells on routine blood work during your hospitalization. Please follow up with your primary care provider for further work up of this condition. You may also benefit from a referral to see a gastroenterologist.

## 2017-07-13 NOTE — DISCHARGE NOTE ADULT - ADDITIONAL INSTRUCTIONS
Dr. Pablo Dupree (Neurology) -- Friday, August 18th, 2017 at 1PM  [address] [place], [place] [address]    Please also schedule an appointment with your primary care physician, Dr. Sergey Alva at a time of your choosing.

## 2017-07-13 NOTE — DISCHARGE NOTE ADULT - CARE PLAN
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 You were admitted to the hospital for treatment of the encephalitis including a course of IV Solumedrol, as well as further work up of the origin including a lumbar puncture and video EEG monitoring. You are being discharged on a taper regimen of prednisone, please take this medication as prescribed. Additionally, please follow up with Dr. Pablo Dupree (Neurology) on August 18th at 1PM as well as your primary care provider at a time of your choosing.  Secondary Diagnosis:	Lyme disease  Instructions for follow-up, activity and diet:	You have a recent diagnosis of Lyme Disease which had not fully resolved at the time of your admission. You were continued on Doxycycline   100mg twice a day. Please continue to take this medication for a total of 21 days.  Secondary Diagnosis:	Microcytic anemia  Instructions for follow-up, activity and diet:	You were found to have a mild anemia with small red blood cells on routine blood work during your hospitalization. Please follow up with your primary care provider for further work up of this condition. 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 You were admitted to the hospital for treatment of the encephalitis including a course of IV Solumedrol, as well as further work up of the origin including a lumbar puncture and video EEG monitoring in addition to neuro-psychological testing. You are being discharged on a taper regimen of prednisone, please take this medication as prescribed. Additionally, please follow up with Dr. Pablo Dupree (Neurology) on August 18th at 1PM as well as your primary care provider at a time of your choosing to address your anemia.  Secondary Diagnosis:	Lyme disease  Instructions for follow-up, activity and diet:	You have a recent diagnosis of Lyme Disease which had not fully resolved at the time of your admission. You were continued on Doxycycline   100mg twice a day. Please continue to take this medication for a total of 21 days.  Secondary Diagnosis:	Microcytic anemia  Instructions for follow-up, activity and diet:	You were found to have a mild anemia with small red blood cells on routine blood work during your hospitalization. Please follow up with your primary care provider for further work up of this condition. You may also benefit from a referral to see a gastroenterologist.

## 2017-07-13 NOTE — PROGRESS NOTE ADULT - PROBLEM SELECTOR PLAN 3
-Regular diet  -Replete lytes to K>4, Mg >2

## 2017-07-13 NOTE — DIETITIAN INITIAL EVALUATION ADULT. - OTHER INFO
Pt admitted for 5d EEG and IV Solumedrol.  Pt with good appetite; consuming >75% of meals. Pt with no recent complaints of GI distress or pain.  Skin: intact.

## 2017-07-13 NOTE — DISCHARGE NOTE ADULT - OTHER SIGNIFICANT FINDINGS
Neurological:  AAOx3, speech fluent, no dysarthria, naming and repetition intact, follows 3 step commands  PERRL, EOMI, VFFC, face symmetric, tongue midline  5/5 x 4  LT intact b/l  DTR symmetric  FTN intact b/l

## 2017-07-13 NOTE — DISCHARGE NOTE ADULT - MEDICATION SUMMARY - MEDICATIONS TO TAKE
I will START or STAY ON the medications listed below when I get home from the hospital:    predniSONE 20 mg oral tablet  -- 40 mg once a day for 1 week  30 mg once a day for 1 week  20mg once a day for 1 week  10 mg once a day for 1 wk  -- It is very important that you take or use this exactly as directed.  Do not skip doses or discontinue unless directed by your doctor.  Obtain medical advice before taking any non-prescription drugs as some may affect the action of this medication.  Take with food or milk.    -- Indication: For Encephalitis    predniSONE 10 mg oral tablet  -- 40 mg once a day for 1 week  30 mg once a day for 1 week  20mg once a day for 1 week  10 mg once a day for 1 wk  Stay on 10mg once a day  -- It is very important that you take or use this exactly as directed.  Do not skip doses or discontinue unless directed by your doctor.  Obtain medical advice before taking any non-prescription drugs as some may affect the action of this medication.  Take with food or milk.    -- Indication: For Encephalitis    doxycycline monohydrate 100 mg oral tablet  -- 1 tab(s) by mouth 2 times a day  -- Indication: For Lyme disease    famotidine 20 mg oral tablet  -- 1 tab(s) by mouth once a day  -- It is very important that you take or use this exactly as directed.  Do not skip doses or discontinue unless directed by your doctor.  Obtain medical advice before taking any non-prescription drugs as some may affect the action of this medication.    -- Indication: For Need for prophylactic measure

## 2017-07-13 NOTE — PROGRESS NOTE ADULT - ASSESSMENT
55 yo male with no significant PMH found to have anti-VGKC antibodies and positive lyme serology at outpatient neurologist during workup for new onset psychiatric disturbances, admitted for VEEG monitoring to r/o subclinical seizures and IV solumedrol for treatment of presumed encephalitis (currently day 3).

## 2017-07-13 NOTE — PROGRESS NOTE ADULT - PROBLEM SELECTOR PLAN 1
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  -Ativan 2 mg IVP PRN for seizures >2 minutes  -IV solumedrol 250 mg Q6 for 5 days, on day 3/5.   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  -Ativan 2 mg IVP PRN for seizures >2 minutes  -Per Neuro, IV solumedrol 250 mg Q6 while admitted, day 3. Pt to be discharged today or tomorrow on prednisone taper.   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  -Ativan 2 mg IVP PRN for seizures >2 minutes  -Per Neuro, IV solumedrol 250 mg Q6 while admitted, day 3. Pt to be discharged tomorrow on prednisone taper.   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  -Ativan 2 mg IVP PRN for seizures >2 minutes  - VEEG d/c'd- normal w/o epileptiform activity x 48 hr  -Per Neuro, IV solumedrol 250 mg Q6 while admitted, day 3. Pt to be discharged tomorrow AM on prednisone taper (will have received total of 3.5gm)  -ISS given steroid injections  -GI ppx  -f/u results of LP (VGKC titers,  oligoclonal bands, IgG index, paraneoplastic panel)  -f/u serum oligoclonal bands

## 2017-07-13 NOTE — DISCHARGE NOTE ADULT - PATIENT PORTAL LINK FT
“You can access the [facility] Patient Portal, offered by [facility], by registering with the following website: [email or web address]”

## 2017-07-13 NOTE — PROGRESS NOTE ADULT - PROBLEM SELECTOR PLAN 4
No need for SQH as pt is low risk for DVT

## 2017-07-14 VITALS
HEART RATE: 79 BPM | TEMPERATURE: 98 F | DIASTOLIC BLOOD PRESSURE: 47 MMHG | OXYGEN SATURATION: 97 % | RESPIRATION RATE: 15 BRPM | SYSTOLIC BLOOD PRESSURE: 125 MMHG

## 2017-07-14 LAB
MISCELLANEOUS TEST: NORMAL
OLIGOCLONAL BANDS CSF ELPH-IMP: SIGNIFICANT CHANGE UP
OLIGOCLONAL BANDS CSF ELPH-IMP: SIGNIFICANT CHANGE UP
PROC NAME: NORMAL

## 2017-07-14 PROCEDURE — 85027 COMPLETE CBC AUTOMATED: CPT

## 2017-07-14 PROCEDURE — 82945 GLUCOSE OTHER FLUID: CPT

## 2017-07-14 PROCEDURE — 83916 OLIGOCLONAL BANDS: CPT

## 2017-07-14 PROCEDURE — 80048 BASIC METABOLIC PNL TOTAL CA: CPT

## 2017-07-14 PROCEDURE — 99238 HOSP IP/OBS DSCHRG MGMT 30/<: CPT | Mod: 25

## 2017-07-14 PROCEDURE — 36415 COLL VENOUS BLD VENIPUNCTURE: CPT

## 2017-07-14 PROCEDURE — 96118: CPT

## 2017-07-14 PROCEDURE — 84157 ASSAY OF PROTEIN OTHER: CPT

## 2017-07-14 PROCEDURE — 96116 NUBHVL XM PHYS/QHP 1ST HR: CPT

## 2017-07-14 PROCEDURE — 95951: CPT

## 2017-07-14 PROCEDURE — 83735 ASSAY OF MAGNESIUM: CPT

## 2017-07-14 PROCEDURE — 96101: CPT

## 2017-07-14 PROCEDURE — 89051 BODY FLUID CELL COUNT: CPT

## 2017-07-14 RX ORDER — FAMOTIDINE 10 MG/ML
1 INJECTION INTRAVENOUS
Qty: 30 | Refills: 0 | OUTPATIENT
Start: 2017-07-14 | End: 2017-08-13

## 2017-07-14 RX ADMIN — PANTOPRAZOLE SODIUM 40 MILLIGRAM(S): 20 TABLET, DELAYED RELEASE ORAL at 07:06

## 2017-07-14 RX ADMIN — Medication 50 MILLIGRAM(S): at 05:53

## 2017-07-14 RX ADMIN — Medication 100 MILLIGRAM(S): at 05:53

## 2017-07-17 DIAGNOSIS — G04.90 ENCEPHALITIS AND ENCEPHALOMYELITIS, UNSPECIFIED: ICD-10-CM

## 2017-07-17 DIAGNOSIS — D50.9 IRON DEFICIENCY ANEMIA, UNSPECIFIED: ICD-10-CM

## 2017-07-17 DIAGNOSIS — A69.20 LYME DISEASE, UNSPECIFIED: ICD-10-CM

## 2017-07-19 ENCOUNTER — RX RENEWAL (OUTPATIENT)
Age: 57
End: 2017-07-19

## 2017-07-22 LAB — MISCELLANEOUS TEST NAME: SIGNIFICANT CHANGE UP

## 2017-07-27 LAB — MISCELLANEOUS TEST NAME: SIGNIFICANT CHANGE UP

## 2017-08-23 ENCOUNTER — CLINICAL ADVICE (OUTPATIENT)
Age: 57
End: 2017-08-23

## 2017-08-24 ENCOUNTER — INPATIENT (INPATIENT)
Facility: HOSPITAL | Age: 57
LOS: 4 days | Discharge: HOME | DRG: 99 | End: 2017-08-29
Attending: PSYCHIATRY & NEUROLOGY | Admitting: PSYCHIATRY & NEUROLOGY
Payer: COMMERCIAL

## 2017-08-24 VITALS
HEART RATE: 76 BPM | TEMPERATURE: 97 F | DIASTOLIC BLOOD PRESSURE: 82 MMHG | OXYGEN SATURATION: 96 % | SYSTOLIC BLOOD PRESSURE: 126 MMHG | RESPIRATION RATE: 16 BRPM

## 2017-08-24 DIAGNOSIS — F06.0 PSYCHOTIC DISORDER WITH HALLUCINATIONS DUE TO KNOWN PHYSIOLOGICAL CONDITION: ICD-10-CM

## 2017-08-24 DIAGNOSIS — R63.8 OTHER SYMPTOMS AND SIGNS CONCERNING FOOD AND FLUID INTAKE: ICD-10-CM

## 2017-08-24 DIAGNOSIS — S46.119A STRAIN OF MUSCLE, FASCIA AND TENDON OF LONG HEAD OF BICEPS, UNSPECIFIED ARM, INITIAL ENCOUNTER: Chronic | ICD-10-CM

## 2017-08-24 DIAGNOSIS — Z29.9 ENCOUNTER FOR PROPHYLACTIC MEASURES, UNSPECIFIED: ICD-10-CM

## 2017-08-24 DIAGNOSIS — G04.90 ENCEPHALITIS AND ENCEPHALOMYELITIS, UNSPECIFIED: ICD-10-CM

## 2017-08-24 LAB
ALBUMIN SERPL ELPH-MCNC: 4.1 G/DL — SIGNIFICANT CHANGE UP (ref 3.3–5)
ALP SERPL-CCNC: 66 U/L — SIGNIFICANT CHANGE UP (ref 40–120)
ALT FLD-CCNC: 16 U/L — SIGNIFICANT CHANGE UP (ref 10–45)
ANION GAP SERPL CALC-SCNC: 14 MMOL/L — SIGNIFICANT CHANGE UP (ref 5–17)
AST SERPL-CCNC: 14 U/L — SIGNIFICANT CHANGE UP (ref 10–40)
BASOPHILS NFR BLD AUTO: 0.3 % — SIGNIFICANT CHANGE UP (ref 0–2)
BILIRUB SERPL-MCNC: 0.4 MG/DL — SIGNIFICANT CHANGE UP (ref 0.2–1.2)
BUN SERPL-MCNC: 20 MG/DL — SIGNIFICANT CHANGE UP (ref 7–23)
CALCIUM SERPL-MCNC: 9.8 MG/DL — SIGNIFICANT CHANGE UP (ref 8.4–10.5)
CHLORIDE SERPL-SCNC: 102 MMOL/L — SIGNIFICANT CHANGE UP (ref 96–108)
CO2 SERPL-SCNC: 24 MMOL/L — SIGNIFICANT CHANGE UP (ref 22–31)
CREAT SERPL-MCNC: 1.1 MG/DL — SIGNIFICANT CHANGE UP (ref 0.5–1.3)
EOSINOPHIL NFR BLD AUTO: 1.4 % — SIGNIFICANT CHANGE UP (ref 0–6)
EXTRA SST TUBE: SIGNIFICANT CHANGE UP
GLUCOSE SERPL-MCNC: 144 MG/DL — HIGH (ref 70–99)
HCT VFR BLD CALC: 40.4 % — SIGNIFICANT CHANGE UP (ref 39–50)
HGB BLD-MCNC: 12.9 G/DL — LOW (ref 13–17)
LYMPHOCYTES # BLD AUTO: 23.6 % — SIGNIFICANT CHANGE UP (ref 13–44)
MCHC RBC-ENTMCNC: 23 PG — LOW (ref 27–34)
MCHC RBC-ENTMCNC: 31.9 G/DL — LOW (ref 32–36)
MCV RBC AUTO: 72 FL — LOW (ref 80–100)
MONOCYTES NFR BLD AUTO: 8.1 % — SIGNIFICANT CHANGE UP (ref 2–14)
NEUTROPHILS NFR BLD AUTO: 66.6 % — SIGNIFICANT CHANGE UP (ref 43–77)
PLATELET # BLD AUTO: 332 K/UL — SIGNIFICANT CHANGE UP (ref 150–400)
POTASSIUM SERPL-MCNC: 4.2 MMOL/L — SIGNIFICANT CHANGE UP (ref 3.5–5.3)
POTASSIUM SERPL-SCNC: 4.2 MMOL/L — SIGNIFICANT CHANGE UP (ref 3.5–5.3)
PROT SERPL-MCNC: 6.7 G/DL — SIGNIFICANT CHANGE UP (ref 6–8.3)
RBC # BLD: 5.61 M/UL — SIGNIFICANT CHANGE UP (ref 4.2–5.8)
RBC # FLD: 21.4 % — HIGH (ref 10.3–16.9)
SODIUM SERPL-SCNC: 140 MMOL/L — SIGNIFICANT CHANGE UP (ref 135–145)
WBC # BLD: 7 K/UL — SIGNIFICANT CHANGE UP (ref 3.8–10.5)
WBC # FLD AUTO: 7 K/UL — SIGNIFICANT CHANGE UP (ref 3.8–10.5)

## 2017-08-24 PROCEDURE — 99223 1ST HOSP IP/OBS HIGH 75: CPT

## 2017-08-24 RX ORDER — DEXTROSE 50 % IN WATER 50 %
25 SYRINGE (ML) INTRAVENOUS ONCE
Qty: 0 | Refills: 0 | Status: DISCONTINUED | OUTPATIENT
Start: 2017-08-24 | End: 2017-08-29

## 2017-08-24 RX ORDER — DIAZEPAM 5 MG
5 TABLET ORAL EVERY 8 HOURS
Qty: 0 | Refills: 0 | Status: DISCONTINUED | OUTPATIENT
Start: 2017-08-24 | End: 2017-08-29

## 2017-08-24 RX ORDER — GLUCAGON INJECTION, SOLUTION 0.5 MG/.1ML
1 INJECTION, SOLUTION SUBCUTANEOUS ONCE
Qty: 0 | Refills: 0 | Status: DISCONTINUED | OUTPATIENT
Start: 2017-08-24 | End: 2017-08-29

## 2017-08-24 RX ORDER — INSULIN LISPRO 100/ML
VIAL (ML) SUBCUTANEOUS
Qty: 0 | Refills: 0 | Status: DISCONTINUED | OUTPATIENT
Start: 2017-08-24 | End: 2017-08-29

## 2017-08-24 RX ORDER — PANTOPRAZOLE SODIUM 20 MG/1
40 TABLET, DELAYED RELEASE ORAL
Qty: 0 | Refills: 0 | Status: DISCONTINUED | OUTPATIENT
Start: 2017-08-24 | End: 2017-08-29

## 2017-08-24 RX ORDER — DEXTROSE 50 % IN WATER 50 %
1 SYRINGE (ML) INTRAVENOUS ONCE
Qty: 0 | Refills: 0 | Status: DISCONTINUED | OUTPATIENT
Start: 2017-08-24 | End: 2017-08-29

## 2017-08-24 RX ORDER — RISPERIDONE 4 MG/1
1 TABLET ORAL AT BEDTIME
Qty: 0 | Refills: 0 | Status: DISCONTINUED | OUTPATIENT
Start: 2017-08-24 | End: 2017-08-29

## 2017-08-24 RX ORDER — SODIUM CHLORIDE 9 MG/ML
1000 INJECTION, SOLUTION INTRAVENOUS
Qty: 0 | Refills: 0 | Status: DISCONTINUED | OUTPATIENT
Start: 2017-08-24 | End: 2017-08-29

## 2017-08-24 RX ORDER — DEXTROSE 50 % IN WATER 50 %
12.5 SYRINGE (ML) INTRAVENOUS ONCE
Qty: 0 | Refills: 0 | Status: DISCONTINUED | OUTPATIENT
Start: 2017-08-24 | End: 2017-08-29

## 2017-08-24 RX ADMIN — Medication 50 MILLIGRAM(S): at 23:42

## 2017-08-24 RX ADMIN — Medication 5 MILLIGRAM(S): at 23:42

## 2017-08-24 RX ADMIN — Medication 50 MILLIGRAM(S): at 18:13

## 2017-08-24 RX ADMIN — RISPERIDONE 1 MILLIGRAM(S): 4 TABLET ORAL at 21:48

## 2017-08-24 NOTE — H&P ADULT - PROBLEM SELECTOR PLAN 3
P: IMPROVE 0, no need for prophylaxis  C: FULL CODE  D: Admit to 7W under Dr. Dupree for EEG monitoring.

## 2017-08-24 NOTE — H&P ADULT - ASSESSMENT
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

## 2017-08-24 NOTE — H&P ADULT - HISTORY OF PRESENT ILLNESS
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 Patient was previously admitted at [facility] in July for vEEG monitoring and work up for encephalitis. He was given 4 days of IV Solumedrol at that time without patient steroid taper and had no signs of seizure activity. Since being discharged patient has been having worsening function at home.  According to his spouse he has been associating people with colors, has not slept in 7 days, and has become "evil." He has restarted oral steroids with no help. 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 Patient was previously admitted at [facility] in July for vEEG monitoring and work up for encephalitis. He was given 4 days of IV Solumedrol at that time without patient steroid taper and had no signs of seizure activity. Since being discharged patient has been having worsening function at home.  He has been seen bright colors, everything appears magnified, has not slept in 7 days. He has also had recurrent headaches during this time. The same symptoms occurred previously when he was previously admitted. He recently finished his outpatient taper of steroids when these symptoms began. He has restarted oral steroids with no help. 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 Patient was previously admitted at [facility] in July for vEEG monitoring and work up for encephalitis. He was given 4 days of IV Solumedrol at that time and had no signs of seizure activity. Since being discharged patient has been having worsening function at home.  He has been seen bright colors, everything appears magnified, has not slept in 7 days. He has also had recurrent headaches during this time. The same symptoms occurred previously when he was previously admitted. He recently finished his outpatient taper of steroids when these symptoms began. He has restarted oral steroids several days ago with no help.  Last night advised to administer 10mg diazepam, and he slept for a few hours.  Given 15mg this AM and some mild improvement in erratic behavior, but still odd visual changes and thought process..

## 2017-08-24 NOTE — H&P ADULT - PROBLEM SELECTOR PLAN 4
Odd visual perceptual symptoms and altered cognition with known vgkc antibodies, r/o seizures or post-ictal psychosis.

## 2017-08-24 NOTE — H&P ADULT - NSHPREVIEWOFSYSTEMS_GEN_ALL_CORE
REVIEW OF SYSTEMS:    CONSTITUTIONAL: No weakness, fevers or chills  EYES/ENT: No visual changes;  No vertigo or throat pain   NECK: No pain or stiffness  RESPIRATORY: No cough, wheezing, hemoptysis; No shortness of breath  CARDIOVASCULAR: No chest pain or palpitations  GASTROINTESTINAL: No abdominal or epigastric pain. No nausea, vomiting, or hematemesis; No diarrhea or constipation. No melena or hematochezia.  GENITOURINARY: No dysuria, frequency or hematuria  NEUROLOGICAL: No numbness or weakness  SKIN: No itching, burning, rashes, or lesions   All other review of systems is negative unless indicated above. REVIEW OF SYSTEMS:    CONSTITUTIONAL: No weakness, fevers or chills  EYES/ENT: +visual changes;  No vertigo or throat pain   NECK: No pain or stiffness  RESPIRATORY: No cough, wheezing, hemoptysis; No shortness of breath  CARDIOVASCULAR: No chest pain or palpitations  GASTROINTESTINAL: No abdominal or epigastric pain. No nausea, vomiting, or hematemesis; No diarrhea or constipation. No melena or hematochezia.  GENITOURINARY: No dysuria, frequency or hematuria  NEUROLOGICAL: No numbness or weakness  SKIN: No itching, burning, rashes, or lesions   All other review of systems is negative unless indicated above. REVIEW OF SYSTEMS: aside from HPI  CONSTITUTIONAL: No weakness, fevers or chills  EYES/ENT: +visual changes;  No vertigo or throat pain   NECK: No pain or stiffness  RESPIRATORY: No cough, wheezing, hemoptysis; No shortness of breath  CARDIOVASCULAR: No chest pain or palpitations  GASTROINTESTINAL: No abdominal or epigastric pain. No nausea, vomiting, or hematemesis; No diarrhea or constipation. No melena or hematochezia.  GENITOURINARY: No dysuria, frequency or hematuria  NEUROLOGICAL: No numbness or weakness  SKIN: No itching, burning, rashes, or lesions   All other review of systems is negative unless indicated above.

## 2017-08-24 NOTE — H&P ADULT - PROBLEM SELECTOR PLAN 1
Patient presenting with acute flare of auto-antibody encephalitis. Previously IV Solumedrol has worked for reducing inflammation; however, patient has not had relief using steroids at home.  -IV Solumedrol 250mg q6h for 5 days with simultaneous IVIg  -vEEG monitoring for seizure activity  -Ativan 2 mg Q4 hour PRN for seizures >2 minutes  -Risperdal 1mg Patient presenting with acute flare of auto-antibody encephalitis. Previously IV Solumedrol has worked for reducing inflammation; however, patient has not had relief using steroids at home.  -IV Solumedrol 250mg q6h for 5 days   -vEEG monitoring for seizure activity  -Ativan 2 mg Q4 hour PRN for seizures >2 minutes  -Risperdal 1mg Patient presenting with acute flare of auto-antibody encephalitis. Previously IV Solumedrol has worked for reducing inflammation; however, patient has not had relief using steroids at home.  -IV Solumedrol 250mg q6h for 5 days   -vEEG monitoring for seizure activity  -Ativan 2 mg Q4 hour PRN for seizures >2 minutes, or agitation  -Valium 5mg q8h for agitation   -Risperdal 1mg tonight for sleep  -Repeat VGKC antibody, serum to be drawn

## 2017-08-24 NOTE — H&P ADULT - NSHPPHYSICALEXAM_GEN_ALL_CORE
VITALS  Vital Signs Last 24 Hrs  T(C): --  T(F): --  HR: --  BP: --  BP(mean): --  RR: --  SpO2: --      PHYSICAL EXAM  General: A&Ox3; NAD  Head: NC/AT; PERRL; EOMI; anicteric sclera  Neck: Supple; no JVD  Respiratory: CTA B/L; no wheezes/crackles/rales auscultated w/ good air movement  Cardiovascular: Regular rhythm/rate; S1/S2; no gallops or murmurs auscultated  Gastrointestinal: Soft; NTND w/out rebound tenderness or guarding; bowel sounds normal  Extremities: WWP; no edema or cyanosis; radial/pedal pulses palpable  Neurological:  CNII-XII grossly intact; no obvious focal deficits VITALS  Vital Signs Last 24 Hrs  T(C): --  T(F): --  HR: --  BP: --  BP(mean): --  RR: --  SpO2: --    I&O's Summary      CAPILLARY BLOOD GLUCOSE          PHYSICAL EXAM  General: A&Ox3; NAD  Head: NC/AT; PERRL; EOMI; anicteric sclera  Neck: Supple; no JVD  Respiratory: CTA B/L; no wheezes/crackles/rales auscultated w/ good air movement  Cardiovascular: Regular rhythm/rate; S1/S2; no gallops or murmurs auscultated  Gastrointestinal: Soft; NTND w/out rebound tenderness or guarding; bowel sounds normal  Extremities: WWP; no edema or cyanosis; radial/pedal pulses palpable  Neurological:  CNII-XII grossly intact; no obvious focal deficits VITALS  Vital Signs Last 24 Hrs  T(C): 36.2 (24 Aug 2017 12:59), Max: 36.2 (24 Aug 2017 12:59)  T(F): 97.2 (24 Aug 2017 12:59), Max: 97.2 (24 Aug 2017 12:59)  HR: 76 (24 Aug 2017 12:59) (76 - 76)  BP: 126/82 (24 Aug 2017 12:59) (126/82 - 126/82)  BP(mean): --  RR: 16 (24 Aug 2017 12:59) (16 - 16)  SpO2: 96% (24 Aug 2017 12:59) (96% - 96%)    PHYSICAL EXAM  General: A&Ox3; NAD  Head: NC/AT; PERRL; EOMI; anicteric sclera  Neck: Supple; no JVD  Respiratory: CTA B/L; no wheezes/crackles/rales auscultated w/ good air movement  Cardiovascular: Regular rhythm/rate; S1/S2; no gallops or murmurs auscultated  Gastrointestinal: Soft; NTND w/out rebound tenderness or guarding; bowel sounds normal  Extremities: WWP; no edema or cyanosis; radial/pedal pulses palpable  Neurological:  CNII-XII grossly intact; no obvious focal deficits

## 2017-08-25 ENCOUNTER — APPOINTMENT (OUTPATIENT)
Dept: NEUROLOGY | Facility: CLINIC | Age: 57
End: 2017-08-25

## 2017-08-25 PROCEDURE — 99233 SBSQ HOSP IP/OBS HIGH 50: CPT

## 2017-08-25 PROCEDURE — 95951: CPT | Mod: 26

## 2017-08-25 PROCEDURE — 93010 ELECTROCARDIOGRAM REPORT: CPT

## 2017-08-25 RX ORDER — POLYETHYLENE GLYCOL 3350 17 G/17G
17 POWDER, FOR SOLUTION ORAL DAILY
Qty: 0 | Refills: 0 | Status: DISCONTINUED | OUTPATIENT
Start: 2017-08-25 | End: 2017-08-29

## 2017-08-25 RX ORDER — RISPERIDONE 4 MG/1
0.5 TABLET ORAL ONCE
Qty: 0 | Refills: 0 | Status: COMPLETED | OUTPATIENT
Start: 2017-08-25 | End: 2017-08-25

## 2017-08-25 RX ORDER — RISPERIDONE 4 MG/1
0.5 TABLET ORAL DAILY
Qty: 0 | Refills: 0 | Status: DISCONTINUED | OUTPATIENT
Start: 2017-08-26 | End: 2017-08-29

## 2017-08-25 RX ORDER — POLYETHYLENE GLYCOL 3350 17 G/17G
17 POWDER, FOR SOLUTION ORAL ONCE
Qty: 0 | Refills: 0 | Status: COMPLETED | OUTPATIENT
Start: 2017-08-25 | End: 2017-08-25

## 2017-08-25 RX ADMIN — Medication 5 MILLIGRAM(S): at 23:37

## 2017-08-25 RX ADMIN — RISPERIDONE 1 MILLIGRAM(S): 4 TABLET ORAL at 22:02

## 2017-08-25 RX ADMIN — RISPERIDONE 0.5 MILLIGRAM(S): 4 TABLET ORAL at 12:03

## 2017-08-25 RX ADMIN — Medication 2: at 22:02

## 2017-08-25 RX ADMIN — Medication 50 MILLIGRAM(S): at 23:37

## 2017-08-25 RX ADMIN — Medication 50 MILLIGRAM(S): at 17:49

## 2017-08-25 RX ADMIN — Medication 50 MILLIGRAM(S): at 12:03

## 2017-08-25 RX ADMIN — Medication 50 MILLIGRAM(S): at 05:47

## 2017-08-25 RX ADMIN — POLYETHYLENE GLYCOL 3350 17 GRAM(S): 17 POWDER, FOR SOLUTION ORAL at 17:49

## 2017-08-25 RX ADMIN — PANTOPRAZOLE SODIUM 40 MILLIGRAM(S): 20 TABLET, DELAYED RELEASE ORAL at 07:06

## 2017-08-25 NOTE — PROGRESS NOTE ADULT - PROBLEM SELECTOR PLAN 1
Patient presenting with acute flare of auto-antibody encephalitis. Previously IV Solumedrol has worked for reducing inflammation; however, patient has not had relief using steroids at home.  -IV Solumedrol 250mg q6h for 5 days (day 2/5)  -vEEG monitoring for seizure activity  -Ativan 2 mg Q4 hour PRN for seizures >2 minutes, or agitation  -Valium 5mg q8h for agitation   -Risperdal 1mg tonight for sleep  -Repeat VGKC antibody, serum to be drawn Patient presenting with acute flare of auto-antibody encephalitis. Previously IV Solumedrol has worked for reducing inflammation; however, patient has not had relief using steroids at home.  -IV Solumedrol 250mg q6h for 5 days (day 2/5)  -vEEG monitoring for seizure activity  -Ativan 2 mg Q4 hour PRN for seizures >2 minutes, or agitation  -Valium 5mg q8h for agitation   -Risperdal 1mg tonight for sleep, add 0.5mg during the afternoons  -Repeat VGKC antibody, serum to be drawn

## 2017-08-25 NOTE — PROGRESS NOTE ADULT - SUBJECTIVE AND OBJECTIVE BOX
INTERVAL HPI/OVERNIGHT EVENTS:  Patient was seen and examined at bedside. As per nurse and patient, no o/n events, patient resting comfortably. Patient reports mild constipation.  Patient denies: fever, chills, dizziness, weakness, HA, Changes in vision, CP, palpitations, SOB, cough, N/V/D, dysuria, changes in bowel movements, LE edema. ROS otherwise negative.    VITAL SIGNS:  T(F): 97.5 (08-25-17 @ 15:08)  HR: 86 (08-25-17 @ 15:08)  BP: 124/69 (08-25-17 @ 15:08)  RR: 16 (08-25-17 @ 15:08)  SpO2: 95% (08-25-17 @ 15:08)  Wt(kg): --    PHYSICAL EXAM:    General: A&Ox3; NAD  Head: NC/AT; PERRL; EOMI; anicteric sclera  Neck: Supple; no JVD  Respiratory: CTA B/L; no wheezes/crackles/rales auscultated w/ good air movement  Cardiovascular: Regular rhythm/rate; S1/S2; no gallops or murmurs auscultated  Gastrointestinal: Soft; NTND w/out rebound tenderness or guarding; bowel sounds normal  Extremities: WWP; no edema or cyanosis; radial/pedal pulses palpable  Neurological:  CNII-XII grossly intact; no obvious focal deficits    MEDICATIONS  (STANDING):  risperiDONE   Tablet 1 milliGRAM(s) Oral at bedtime  methylPREDNISolone sodium succinate IVPB 250 milliGRAM(s) IV Intermittent every 6 hours  pantoprazole    Tablet 40 milliGRAM(s) Oral before breakfast  insulin lispro (HumaLOG) corrective regimen sliding scale   SubCutaneous Before meals and at bedtime  dextrose 5%. 1000 milliLiter(s) (50 mL/Hr) IV Continuous <Continuous>  dextrose 50% Injectable 12.5 Gram(s) IV Push once  dextrose 50% Injectable 25 Gram(s) IV Push once  dextrose 50% Injectable 25 Gram(s) IV Push once  polyethylene glycol 3350 17 Gram(s) Oral once    MEDICATIONS  (PRN):  diazepam    Tablet 5 milliGRAM(s) Oral every 8 hours PRN agitation  LORazepam   Injectable 2 milliGRAM(s) IV Push every 4 hours PRN seizure or acute agitation  dextrose Gel 1 Dose(s) Oral once PRN Blood Glucose LESS THAN 70 milliGRAM(s)/deciliter  glucagon  Injectable 1 milliGRAM(s) IntraMuscular once PRN Glucose LESS THAN 70 milligrams/deciliter  polyethylene glycol 3350 17 Gram(s) Oral daily PRN Constipation      Allergies    No Known Allergies    Intolerances        LABS:                        12.9   7.0   )-----------( 332      ( 24 Aug 2017 15:56 )             40.4     08-24    140  |  102  |  20  ----------------------------<  144<H>  4.2   |  24  |  1.10    Ca    9.8      24 Aug 2017 15:56    TPro  6.7  /  Alb  4.1  /  TBili  0.4  /  DBili  x   /  AST  14  /  ALT  16  /  AlkPhos  66  08-24          RADIOLOGY & ADDITIONAL TESTS:  Reviewed INTERVAL HPI/OVERNIGHT EVENTS:  Patient was seen and examined at bedside. As per nurse and patient, no o/n events, patient resting comfortably. Patient reports mild constipation.  Patient denies: fever, chills, dizziness, weakness, HA, Changes in vision, CP, palpitations, SOB, cough, N/V/D, dysuria, changes in bowel movements, LE edema.     Has trouble remembering details of yesterday, only vague recollection.  We spoke to his wife on speaker phone about the potential of using IVIg, however the patient himself stated he did not wish to have blood products at this time, only if it was last resort, or life saving.    ROS otherwise negative.    VITAL SIGNS:  T(F): 97.5 (08-25-17 @ 15:08)  HR: 86 (08-25-17 @ 15:08)  BP: 124/69 (08-25-17 @ 15:08)  RR: 16 (08-25-17 @ 15:08)  SpO2: 95% (08-25-17 @ 15:08)  Wt(kg): --    PHYSICAL EXAM:    General: A&Ox3; NAD  Head: NC/AT; PERRL; EOMI; anicteric sclera  Neck: Supple; no JVD  Respiratory: CTA B/L; no wheezes/crackles/rales auscultated w/ good air movement  Cardiovascular: Regular rhythm/rate; S1/S2; no gallops or murmurs auscultated  Gastrointestinal: Soft; NTND w/out rebound tenderness or guarding; bowel sounds normal  Extremities: WWP; no edema or cyanosis; radial/pedal pulses palpable  Neurological:  CNII-XII grossly intact; no obvious focal deficits    MEDICATIONS  (STANDING):  risperiDONE   Tablet 1 milliGRAM(s) Oral at bedtime  methylPREDNISolone sodium succinate IVPB 250 milliGRAM(s) IV Intermittent every 6 hours  pantoprazole    Tablet 40 milliGRAM(s) Oral before breakfast  insulin lispro (HumaLOG) corrective regimen sliding scale   SubCutaneous Before meals and at bedtime  dextrose 5%. 1000 milliLiter(s) (50 mL/Hr) IV Continuous <Continuous>  dextrose 50% Injectable 12.5 Gram(s) IV Push once  dextrose 50% Injectable 25 Gram(s) IV Push once  dextrose 50% Injectable 25 Gram(s) IV Push once  polyethylene glycol 3350 17 Gram(s) Oral once    MEDICATIONS  (PRN):  diazepam    Tablet 5 milliGRAM(s) Oral every 8 hours PRN agitation  LORazepam   Injectable 2 milliGRAM(s) IV Push every 4 hours PRN seizure or acute agitation  dextrose Gel 1 Dose(s) Oral once PRN Blood Glucose LESS THAN 70 milliGRAM(s)/deciliter  glucagon  Injectable 1 milliGRAM(s) IntraMuscular once PRN Glucose LESS THAN 70 milligrams/deciliter  polyethylene glycol 3350 17 Gram(s) Oral daily PRN Constipation      Allergies    No Known Allergies    Intolerances        LABS:                        12.9   7.0   )-----------( 332      ( 24 Aug 2017 15:56 )             40.4     08-24    140  |  102  |  20  ----------------------------<  144<H>  4.2   |  24  |  1.10    Ca    9.8      24 Aug 2017 15:56    TPro  6.7  /  Alb  4.1  /  TBili  0.4  /  DBili  x   /  AST  14  /  ALT  16  /  AlkPhos  66  08-24          RADIOLOGY & ADDITIONAL TESTS:  Reviewed

## 2017-08-25 NOTE — PROGRESS NOTE ADULT - ATTENDING COMMENTS
Pt did not wish to have IVIg due to it being a blood product, and he reasoned quite rightly that he responded well to solumedrol during the prior admission, so we will continue with solumedrol, but for 5 days this time.  vEEG to continue due to intermittent memory lapses and possibility of seizures causing these.

## 2017-08-25 NOTE — PROGRESS NOTE ADULT - PROBLEM SELECTOR PLAN 4
Odd visual perceptual symptoms and altered cognition with known vgkc antibodies, r/o seizures or post-ictal psychosis. 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

## 2017-08-26 PROCEDURE — 95951: CPT | Mod: 26

## 2017-08-26 PROCEDURE — 99232 SBSQ HOSP IP/OBS MODERATE 35: CPT

## 2017-08-26 RX ADMIN — RISPERIDONE 1 MILLIGRAM(S): 4 TABLET ORAL at 23:57

## 2017-08-26 RX ADMIN — Medication 50 MILLIGRAM(S): at 12:31

## 2017-08-26 RX ADMIN — RISPERIDONE 0.5 MILLIGRAM(S): 4 TABLET ORAL at 18:22

## 2017-08-26 RX ADMIN — Medication 50 MILLIGRAM(S): at 06:02

## 2017-08-26 RX ADMIN — Medication 4: at 22:34

## 2017-08-26 RX ADMIN — Medication 2: at 08:55

## 2017-08-26 RX ADMIN — Medication 50 MILLIGRAM(S): at 18:22

## 2017-08-26 RX ADMIN — PANTOPRAZOLE SODIUM 40 MILLIGRAM(S): 20 TABLET, DELAYED RELEASE ORAL at 07:12

## 2017-08-26 NOTE — PROGRESS NOTE ADULT - SUBJECTIVE AND OBJECTIVE BOX
Subjective  No events overnight. No complaints currently.    ROS  As above, otherwise negative for constitutional/HEENT/CV/pulm/GI//MSK/neuro/derm/endocrine/psych.     MEDICATIONS  (STANDING):  risperiDONE   Tablet 1 milliGRAM(s) Oral at bedtime  methylPREDNISolone sodium succinate IVPB 250 milliGRAM(s) IV Intermittent every 6 hours  pantoprazole    Tablet 40 milliGRAM(s) Oral before breakfast  insulin lispro (HumaLOG) corrective regimen sliding scale   SubCutaneous Before meals and at bedtime  dextrose 5%. 1000 milliLiter(s) (50 mL/Hr) IV Continuous <Continuous>  dextrose 50% Injectable 12.5 Gram(s) IV Push once  dextrose 50% Injectable 25 Gram(s) IV Push once  dextrose 50% Injectable 25 Gram(s) IV Push once  risperiDONE   Tablet 0.5 milliGRAM(s) Oral daily    MEDICATIONS  (PRN):  diazepam    Tablet 5 milliGRAM(s) Oral every 8 hours PRN agitation  LORazepam   Injectable 2 milliGRAM(s) IV Push every 4 hours PRN seizure or acute agitation  dextrose Gel 1 Dose(s) Oral once PRN Blood Glucose LESS THAN 70 milliGRAM(s)/deciliter  glucagon  Injectable 1 milliGRAM(s) IntraMuscular once PRN Glucose LESS THAN 70 milligrams/deciliter  polyethylene glycol 3350 17 Gram(s) Oral daily PRN Constipation      T(C): 36.5 (08-26-17 @ 12:25), Max: 36.5 (08-26-17 @ 05:17)  HR: 77 (08-26-17 @ 12:25) (77 - 86)  BP: 126/79 (08-26-17 @ 12:25) (124/69 - 128/76)  RR: 16 (08-26-17 @ 12:25) (14 - 16)  SpO2: 95% (08-26-17 @ 12:25) (95% - 96%)  Wt(kg): --    Eyes open spontaneously. Conversational with appropriate sentences.  EOMI. Visual fields full. PERRL 3>2. Face symmetrical.  Full strength throughout.  Finger-nose-finger intact R/L.  Intact to light touch throughout.    CBC Full  -  ( 24 Aug 2017 15:56 )  WBC Count : 7.0 K/uL  Hemoglobin : 12.9 g/dL  Hematocrit : 40.4 %  Platelet Count - Automated : 332 K/uL  Mean Cell Volume : 72.0 fL  Mean Cell Hemoglobin : 23.0 pg  Mean Cell Hemoglobin Concentration : 31.9 g/dL  Auto Neutrophil # : x  Auto Lymphocyte # : x  Auto Monocyte # : x  Auto Eosinophil # : x  Auto Basophil # : x  Auto Neutrophil % : 66.6 %  Auto Lymphocyte % : 23.6 %  Auto Monocyte % : 8.1 %  Auto Eosinophil % : 1.4 %  Auto Basophil % : 0.3 %    08-24    140  |  102  |  20  ----------------------------<  144<H>  4.2   |  24  |  1.10    Ca    9.8      24 Aug 2017 15:56    TPro  6.7  /  Alb  4.1  /  TBili  0.4  /  DBili  x   /  AST  14  /  ALT  16  /  AlkPhos  66  08-24    LIVER FUNCTIONS - ( 24 Aug 2017 15:56 )  Alb: 4.1 g/dL / Pro: 6.7 g/dL / ALK PHOS: 66 U/L / ALT: 16 U/L / AST: 14 U/L / GGT: x

## 2017-08-27 ENCOUNTER — TRANSCRIPTION ENCOUNTER (OUTPATIENT)
Age: 57
End: 2017-08-27

## 2017-08-27 LAB
ANION GAP SERPL CALC-SCNC: 22 MMOL/L — HIGH (ref 5–17)
BUN SERPL-MCNC: 18 MG/DL — SIGNIFICANT CHANGE UP (ref 7–23)
CALCIUM SERPL-MCNC: 9.8 MG/DL — SIGNIFICANT CHANGE UP (ref 8.4–10.5)
CHLORIDE SERPL-SCNC: 99 MMOL/L — SIGNIFICANT CHANGE UP (ref 96–108)
CO2 SERPL-SCNC: 19 MMOL/L — LOW (ref 22–31)
CREAT SERPL-MCNC: 1.1 MG/DL — SIGNIFICANT CHANGE UP (ref 0.5–1.3)
GLUCOSE SERPL-MCNC: 146 MG/DL — HIGH (ref 70–99)
MAGNESIUM SERPL-MCNC: 2.4 MG/DL — SIGNIFICANT CHANGE UP (ref 1.6–2.6)
POTASSIUM SERPL-MCNC: 3.9 MMOL/L — SIGNIFICANT CHANGE UP (ref 3.5–5.3)
POTASSIUM SERPL-SCNC: 3.9 MMOL/L — SIGNIFICANT CHANGE UP (ref 3.5–5.3)
SODIUM SERPL-SCNC: 140 MMOL/L — SIGNIFICANT CHANGE UP (ref 135–145)

## 2017-08-27 PROCEDURE — 99232 SBSQ HOSP IP/OBS MODERATE 35: CPT

## 2017-08-27 RX ADMIN — PANTOPRAZOLE SODIUM 40 MILLIGRAM(S): 20 TABLET, DELAYED RELEASE ORAL at 06:56

## 2017-08-27 RX ADMIN — Medication 2: at 12:46

## 2017-08-27 RX ADMIN — Medication 50 MILLIGRAM(S): at 00:10

## 2017-08-27 RX ADMIN — Medication 50 MILLIGRAM(S): at 18:06

## 2017-08-27 RX ADMIN — Medication 50 MILLIGRAM(S): at 23:09

## 2017-08-27 RX ADMIN — RISPERIDONE 1 MILLIGRAM(S): 4 TABLET ORAL at 21:53

## 2017-08-27 RX ADMIN — RISPERIDONE 0.5 MILLIGRAM(S): 4 TABLET ORAL at 14:17

## 2017-08-27 RX ADMIN — Medication 50 MILLIGRAM(S): at 12:47

## 2017-08-27 RX ADMIN — Medication 50 MILLIGRAM(S): at 06:56

## 2017-08-27 NOTE — DISCHARGE NOTE ADULT - CARE PLAN
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 You received IV steroids for 5 days. You brain activity was monitored with video EEG and no seizure activities were detected. 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 You received IV steroids for 5 days. You brain activity was monitored with video EEG and no seizure activities were detected. You will follow up with Dr. 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 This is likely due to your autoimmune encephalitis. 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 You received IV steroids for 5 days. You brain activity was monitored with video EEG and no seizure activity was captured. You will follow up with Dr. Dupree as outpatient.  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 This is likely due to your autoimmune encephalitis.  You did not have any of these episodes during your admission. 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 You received IV steroids for 5 days. You brain activity was monitored with video EEG and no seizure activity was captured. You will follow up with Dr. Dupree as outpatient.    We are putting you on a tapering dose of prednisone, which means that we will slowly decrease the dose over several weeks.  For the first 5 days you will take 60mg daily, followed by 50mg for the following 5, 40mg for following 5 and so on.  Day 1-5: 60mg  Day 6-10: 50mg  Day 11-15: 40mg  Day 16-20: 30mg  Day 21-25: 20mg  Day 26-30:10mg  Day 31: STOP    We are also placing you on Celcept 500mg two times per day.    We are placing you on risperdol 1mg daily for one week followed by 0.5mg thereafter.  You should call Dr. Dupree 2 weeks after discharge to follow up regarding whether he would like you to continue taking risperdol.  You should also schedule an appointment with Dr. Dupree one month after discharge.  If your symptoms worsen, please call Dr. Dupree immediately or go to the emergency room.  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 This is likely due to your autoimmune encephalitis.  You did not have any of these episodes during your admission. 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 You received IV steroids for 5 days. You brain activity was monitored with video EEG and no seizure activity was captured. You will follow up with Dr. Dupree as outpatient.    We are putting you on a tapering dose of prednisone, which means that we will slowly decrease the dose over several weeks.  For the first 5 days you will take 60mg daily, followed by 50mg for the following 5, 40mg for following 5 and so on.  Day 1-5: 60mg  Day 6-10: 50mg  Day 11-15: 40mg  Day 16-20: 30mg  Day 21-25: 20mg  Day 26-30:10mg  Day 31: STOP    We are also placing you on Celcept 500mg two times per day.    We are placing you on risperdol 1mg daily for one week followed by 0.5mg thereafter.  You should call Dr. Dupree 2 weeks after discharge to follow up regarding whether he would like you to continue taking risperdol.  You should also schedule an appointment with Dr. Dupree one month after discharge.  If your symptoms worsen, please call Dr. Dupree immediately or go to the emergency room.  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  This is likely due to your autoimmune encephalitis.  These episodes/complaints resolved within days of your admission.

## 2017-08-27 NOTE — PROGRESS NOTE ADULT - PROBLEM SELECTOR PLAN 1
Patient presenting with acute flare of auto-antibody encephalitis. Previously IV Solumedrol has worked for reducing inflammation; however, patient has not had relief using steroids at home.  -IV Solumedrol 250mg q6h for 5 days (day 4/5)  -vEEG monitoring for seizure activity  -Ativan 2 mg Q4 hour PRN for seizures >2 minutes, or agitation  -Valium 5mg q8h for agitation   -Risperdal 1mg tonight for sleep, add 0.5mg during the afternoons  VEEG with no seizure acitvity

## 2017-08-27 NOTE — PROGRESS NOTE ADULT - SUBJECTIVE AND OBJECTIVE BOX
Subjective  No events overnight. No complaints currently.    ROS  As above, otherwise negative for constitutional/HEENT/CV/pulm/GI//MSK/neuro/derm/endocrine/psych.     MEDICATIONS  (STANDING):  risperiDONE   Tablet 1 milliGRAM(s) Oral at bedtime  methylPREDNISolone sodium succinate IVPB 250 milliGRAM(s) IV Intermittent every 6 hours  pantoprazole    Tablet 40 milliGRAM(s) Oral before breakfast  insulin lispro (HumaLOG) corrective regimen sliding scale   SubCutaneous Before meals and at bedtime  dextrose 5%. 1000 milliLiter(s) (50 mL/Hr) IV Continuous <Continuous>  dextrose 50% Injectable 12.5 Gram(s) IV Push once  dextrose 50% Injectable 25 Gram(s) IV Push once  dextrose 50% Injectable 25 Gram(s) IV Push once  risperiDONE   Tablet 0.5 milliGRAM(s) Oral daily    MEDICATIONS  (PRN):  diazepam    Tablet 5 milliGRAM(s) Oral every 8 hours PRN agitation  LORazepam   Injectable 2 milliGRAM(s) IV Push every 4 hours PRN seizure or acute agitation  dextrose Gel 1 Dose(s) Oral once PRN Blood Glucose LESS THAN 70 milliGRAM(s)/deciliter  glucagon  Injectable 1 milliGRAM(s) IntraMuscular once PRN Glucose LESS THAN 70 milligrams/deciliter  polyethylene glycol 3350 17 Gram(s) Oral daily PRN Constipation      T(C): 36.8 (08-27-17 @ 12:24), Max: 36.8 (08-27-17 @ 12:24)  HR: 91 (08-27-17 @ 12:24) (72 - 91)  BP: 148/71 (08-27-17 @ 12:24) (133/66 - 148/71)  RR: 14 (08-27-17 @ 12:24) (14 - 16)  SpO2: 96% (08-27-17 @ 12:24) (96% - 975%)  Wt(kg): --    Eyes open spontaneously. Conversational with appropriate sentences.  EOMI. Visual fields full. PERRL 3>2. Face symmetrical.  Full strength throughout.  Finger-nose-finger intact R/L.  Intact to light touch throughout.      08-27    140  |  99  |  18  ----------------------------<  146<H>  3.9   |  19<L>  |  1.10    Ca    9.8      27 Aug 2017 07:19  Mg     2.4     08-27

## 2017-08-27 NOTE — DISCHARGE NOTE ADULT - NS AS ACTIVITY OBS
Stairs allowed/Return to Work/School allowed/Sex allowed/Showering allowed/Do not drive or operate machinery/Walking-Outdoors allowed/Walking-Indoors allowed

## 2017-08-27 NOTE — DISCHARGE NOTE ADULT - MEDICATION SUMMARY - MEDICATIONS TO TAKE
I will START or STAY ON the medications listed below when I get home from the hospital:    predniSONE 20 mg oral tablet  -- Day 1-5: 60mg Day 6-10: 50mg Day 11-15: 40mg Day 16-20: 30mg Day 21-25: 20mg Day 26-30:10mg Day 31: STOP  -- It is very important that you take or use this exactly as directed.  Do not skip doses or discontinue unless directed by your doctor.  Obtain medical advice before taking any non-prescription drugs as some may affect the action of this medication.  Take with food or milk.    -- Indication: For Encephalitis    predniSONE 10 mg oral tablet  -- Day 1-5: 60mg Day 6-10: 50mg Day 11-15: 40mg Day 16-20: 30mg Day 21-25: 20mg Day 26-30:10mg Day 31: STOP  -- It is very important that you take or use this exactly as directed.  Do not skip doses or discontinue unless directed by your doctor.  Obtain medical advice before taking any non-prescription drugs as some may affect the action of this medication.  Take with food or milk.    -- Indication: For Encephalitis    RisperDAL 1 mg oral tablet  -- 1 tab(s) by mouth once a day (at bedtime) for one week followed by 1/2 tab for the following week.  -- Indication: For Agitation    famotidine 20 mg oral tablet  -- 1 tab(s) by mouth once a day  -- It is very important that you take or use this exactly as directed.  Do not skip doses or discontinue unless directed by your doctor.  Obtain medical advice before taking any non-prescription drugs as some may affect the action of this medication.    -- Indication: For GERD    CellCept 500 mg oral tablet  -- 1 tab(s) by mouth 2 times a day  -- Do not take this drug if you are pregnant.    -- Indication: For Encephalitis I will START or STAY ON the medications listed below when I get home from the hospital:    predniSONE 10 mg oral tablet  -- Day 1-5: 60mg Day 6-10: 50mg Day 11-15: 40mg Day 16-20: 30mg Day 21-25: 20mg Day 26-30:10mg Day 31: STOP  -- It is very important that you take or use this exactly as directed.  Do not skip doses or discontinue unless directed by your doctor.  Obtain medical advice before taking any non-prescription drugs as some may affect the action of this medication.  Take with food or milk.    -- Indication: For Encephalitis    RisperDAL 1 mg oral tablet  -- 1 tab(s) by mouth once a day (at bedtime) for one week followed by 1/2 tab for the following week.  -- Indication: For Agitation    famotidine 20 mg oral tablet  -- 1 tab(s) by mouth once a day  -- It is very important that you take or use this exactly as directed.  Do not skip doses or discontinue unless directed by your doctor.  Obtain medical advice before taking any non-prescription drugs as some may affect the action of this medication.    -- Indication: For GERD    CellCept 500 mg oral tablet  -- 1 tab(s) by mouth 2 times a day  -- Do not take this drug if you are pregnant.    -- Indication: For Encephalitis

## 2017-08-27 NOTE — DISCHARGE NOTE ADULT - HOSPITAL COURSE
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 Patient was previously admitted at [facility] in July for vEEG monitoring and work up for encephalitis. He was given 4 days of IV Solumedrol at that time and had no signs of seizure activity. Since being discharged patient has been having worsening function at home.  He has been seen bright colors, everything appears magnified, has not slept in 7 days. He has also had recurrent headaches during this time. The same symptoms occurred previously when he was previously admitted. He recently finished his outpatient taper of steroids when these symptoms began. He has restarted oral steroids several days ago with no help.  Last night advised to administer 10mg diazepam, and he slept for a few hours.  Given 15mg this AM and some mild improvement in erratic behavior, but still odd visual changes and thought process.. 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 Patient was previously admitted at [facility] in July for vEEG monitoring and work up for encephalitis. He was given 4 days of IV Solumedrol at that time and had no signs of seizure activity. Since being discharged patient has been having worsening function at home.  He has been seen bright colors, everything appears magnified, has not slept in 7 days. He has also had recurrent headaches during this time. The same symptoms occurred previously when he was previously admitted. He recently finished his outpatient taper of steroids when these symptoms began. He has restarted oral steroids several days ago with no help.  Diazepam improved sleep but only mild improvement in erratic behavior. Pt still experienced odd visual changes and thought process. He received IV solumedrol for 5 days. VEEG monitoring revealed no seizure activities.    On the day of discharge, the patient was seen and examined. Symptoms improved. Vital signs are stable. Labs and imaging reviewed. Patient is medically optimized and hemodynamically stable. Return precautions discussed, medication teach back done, and importance of physician followup emphasized. The patient verbalized understanding.

## 2017-08-27 NOTE — PROGRESS NOTE ADULT - SUBJECTIVE AND OBJECTIVE BOX
INTERVAL HPI/OVERNIGHT EVENTS: No acute events overnight. Pt requesting for lyme serologies     ROS  CV: Denies chest pain, palpitations  RESP: Denies SOB  GI: Denies abdominal pain, constipation, diarrhea, nausea, vomiting  : Denies dysuria, hematuria, flank or back pain  ID: Denies fevers, chills  MSK: Denies joint pain   DERM: Denies any rashes, bruising, pruritis  ENDO: Denies heat or cold intolerances, changes in weight, thinning of hair  PSYCH: Denies any mood changes    VITAL SIGNS:  T(F): 97.6 (08-27-17 @ 18:17)  HR: 86 (08-27-17 @ 18:17)  BP: 128/69 (08-27-17 @ 18:17)  RR: 16 (08-27-17 @ 18:17)  SpO2: 96% (08-27-17 @ 18:17)  Wt(kg): --    PHYSICAL EXAM:    Constitutional: WDWN, NAD, resting comfortably   Head: NC/AT  Eyes: PERRL, EOMI, anicteric sclera, no mucosal pallor  ENT: no nasal discharge; uvula midline, no oropharyngeal erythema or exudates; MMM  Neck: supple; no JVD or thyromegaly, no lymphadenopathy  Respiratory: CTA B/L; no W/R/R, no retractions  Cardiac: +S1/S2; RRR; no M/R/G; PMI non-displaced  Gastrointestinal: abdomen soft, NT/ND; no rebound or guarding; +BSx4  Back: spine midline, no bony tenderness or step-offs; no CVAT B/L  Extremities: warm; no calf tenderness, no pedal edema, no cyanosis, no clubbing  Musculoskeletal: NROM x4; no joint swelling, tenderness or erythema  Vascular: 2+ radial, femoral; DP/PT pulses B/L  Dermatologic: no rash, no petechia   Lymphatic: no submandibular or cervical LAD  Neurologic: AAOx3; CNII-XII grossly intact; sensory symmetrically intact in B/L LE   Psychiatric: pleasant and conversive; affect and characteristics of appearance, verbalizations, behaviors are appropriate      MEDICATIONS  (STANDING):  risperiDONE   Tablet 1 milliGRAM(s) Oral at bedtime  methylPREDNISolone sodium succinate IVPB 250 milliGRAM(s) IV Intermittent every 6 hours  pantoprazole    Tablet 40 milliGRAM(s) Oral before breakfast  insulin lispro (HumaLOG) corrective regimen sliding scale   SubCutaneous Before meals and at bedtime  dextrose 5%. 1000 milliLiter(s) (50 mL/Hr) IV Continuous <Continuous>  dextrose 50% Injectable 12.5 Gram(s) IV Push once  dextrose 50% Injectable 25 Gram(s) IV Push once  dextrose 50% Injectable 25 Gram(s) IV Push once  risperiDONE   Tablet 0.5 milliGRAM(s) Oral daily    MEDICATIONS  (PRN):  diazepam    Tablet 5 milliGRAM(s) Oral every 8 hours PRN agitation  LORazepam   Injectable 2 milliGRAM(s) IV Push every 4 hours PRN seizure or acute agitation  dextrose Gel 1 Dose(s) Oral once PRN Blood Glucose LESS THAN 70 milliGRAM(s)/deciliter  glucagon  Injectable 1 milliGRAM(s) IntraMuscular once PRN Glucose LESS THAN 70 milligrams/deciliter  polyethylene glycol 3350 17 Gram(s) Oral daily PRN Constipation      Allergies    No Known Allergies    Intolerances        LABS:    08-27    140  |  99  |  18  ----------------------------<  146<H>  3.9   |  19<L>  |  1.10    Ca    9.8      27 Aug 2017 07:19  Mg     2.4     08-27            RADIOLOGY & ADDITIONAL TESTS:

## 2017-08-27 NOTE — DISCHARGE NOTE ADULT - CARE PROVIDER_API CALL
Pablo Dupree), Clinical Neurophysiology; Neurology; Sleep Medicine  [address] [place] [address]  Phone: [phone]  Fax: [phone]

## 2017-08-27 NOTE — DISCHARGE NOTE ADULT - MEDICATION SUMMARY - MEDICATIONS TO STOP TAKING
I will STOP taking the medications listed below when I get home from the hospital:    doxycycline monohydrate 100 mg oral tablet  -- 1 tab(s) by mouth 2 times a day

## 2017-08-27 NOTE — DISCHARGE NOTE ADULT - PATIENT PORTAL LINK FT
“You can access the [facility] Patient Portal, offered by [facility], by registering with the following website: [email or web address]”

## 2017-08-27 NOTE — DISCHARGE NOTE ADULT - MEDICATION SUMMARY - MEDICATIONS TO CHANGE
I will SWITCH the dose or number of times a day I take the medications listed below when I get home from the hospital:    predniSONE 10 mg oral tablet  -- 40 mg once a day for 1 week 30 mg once a day for 1 week 20mg once a day for 1 week 10 mg once a day for 1 wk Stay on 10mg once a day  -- It is very important that you take or use this exactly as directed.  Do not skip doses or discontinue unless directed by your doctor.  Obtain medical advice before taking any non-prescription drugs as some may affect the action of this medication.  Take with food or milk.    predniSONE 20 mg oral tablet  -- 40 mg once a day for 1 week 30 mg once a day for 1 week 20mg once a day for 1 week 10 mg once a day for 1 wk  -- It is very important that you take or use this exactly as directed.  Do not skip doses or discontinue unless directed by your doctor.  Obtain medical advice before taking any non-prescription drugs as some may affect the action of this medication.  Take with food or milk.

## 2017-08-27 NOTE — PROGRESS NOTE ADULT - PROBLEM SELECTOR PLAN 4
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

## 2017-08-28 PROCEDURE — 99232 SBSQ HOSP IP/OBS MODERATE 35: CPT

## 2017-08-28 RX ADMIN — Medication 50 MILLIGRAM(S): at 06:32

## 2017-08-28 RX ADMIN — Medication 50 MILLIGRAM(S): at 23:09

## 2017-08-28 RX ADMIN — Medication 50 MILLIGRAM(S): at 11:24

## 2017-08-28 RX ADMIN — PANTOPRAZOLE SODIUM 40 MILLIGRAM(S): 20 TABLET, DELAYED RELEASE ORAL at 06:32

## 2017-08-28 RX ADMIN — Medication 50 MILLIGRAM(S): at 17:08

## 2017-08-28 RX ADMIN — RISPERIDONE 0.5 MILLIGRAM(S): 4 TABLET ORAL at 11:24

## 2017-08-28 NOTE — PROGRESS NOTE ADULT - ASSESSMENT
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
55 y/o man, hx of VGKC encephalitis and Lyme disease who presented due to worsening mental status. Differential includes seizures, post-ictal psychosis, and/or worsened encephalitis. EEG has remained normal during this admission, with no spells captured. EEG was disconnected 8/26. He is receiving 5 days of solumedrol.    - continue 5 day course of solumedrol (last day should be Monday)  - DC EEG.   - f/u VGKV ab  - continue current meds.
55 y/o man, hx of VGKC encephalitis and Lyme disease who presented due to worsening mental status. Differential includes seizures, post-ictal psychosis, and/or worsened encephalitis. EEG has remained normal during this admission, with no spells captured. EEG was disconnected 8/26. He is receiving 5 days of solumedrol.    - continue 5 day course of solumedrol (last day should be Monday)  - f/u VGKV ab  - continue current meds.
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
57 y/o man, hx of VGKC encephalitis and prior Lyme disease who presented due to worsening mental status. Differential includes seizures, post-ictal psychosis, and/or worsened encephalitis. EEG has remained normal during this admission, with no spells captured. EEG was disconnected 8/26. He is receiving 5 days of solumedrol.    - continue 5 day course of solumedrol (last day should be Tuesday, will provide the total remaining dose in the AM tomorrow)  - f/u VGKV ab; Lyme C6 w/reflex  - continue current meds.

## 2017-08-28 NOTE — PROGRESS NOTE ADULT - PROBLEM SELECTOR PLAN 3
P: IMPROVE 0, no need for prophylaxis  C: FULL CODE  D: Admit to 7W under Dr. Dupree for EEG monitoring.
P: IMPROVE 0, no need for prophylaxis  C: FULL CODE
P: IMPROVE 0, no need for prophylaxis  C: FULL CODE  D: Admit to 7W under Dr. Dupree for EEG monitoring.

## 2017-08-28 NOTE — PROGRESS NOTE ADULT - PROBLEM SELECTOR PLAN 2
F: No fluids  E: Replete Electrolytes K>4, Mg>2  N: Reg Diet
F: No fluids  E: Replete Electrolytes K>4, Mg>2  N: Reg Diet
F: No fluids as tolerating PO  E: Replete Electrolytes K>4, Mg>2  N: Reg Diet

## 2017-08-28 NOTE — PROGRESS NOTE ADULT - PROBLEM SELECTOR PROBLEM 1
Limbic encephalitis associated with VGKC antibody

## 2017-08-28 NOTE — PROGRESS NOTE ADULT - PROBLEM SELECTOR PROBLEM 4
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

## 2017-08-28 NOTE — PROGRESS NOTE ADULT - PROBLEM SELECTOR PLAN 1
Patient presenting with acute flare of auto-antibody encephalitis. Previously IV Solumedrol has worked for reducing inflammation; however, patient has not had relief using steroids at home.  -IV Solumedrol 250mg q6h for 5 days (day 4/5)- will receive last dose as 500mg  -vEEG monitoring for seizure activity  -Ativan 2 mg Q4 hour PRN for seizures >2 minutes, or agitation  -Valium 5mg q8h for agitation   -Risperdal 1mg tonight for sleep and 0.5mg during the afternoons  -Repeat VGKC antibody, serum to be drawn

## 2017-08-28 NOTE — PROGRESS NOTE ADULT - SUBJECTIVE AND OBJECTIVE BOX
Subjective  No events overnight. No complaints currently, except reports feeling that he needs to leave the hospital ASAP.  Feeling locked up/anxious.  No seizures, memory improved, feels he is at about 80-90% of baseline cognitive functioning.  Still a bit hazy.      ROS  As above, otherwise negative for constitutional/HEENT/CV/pulm/GI//MSK/neuro/derm/endocrine/psych.     MEDICATIONS  (STANDING):  risperiDONE   Tablet 1 milliGRAM(s) Oral at bedtime  methylPREDNISolone sodium succinate IVPB 250 milliGRAM(s) IV Intermittent every 6 hours  pantoprazole    Tablet 40 milliGRAM(s) Oral before breakfast  insulin lispro (HumaLOG) corrective regimen sliding scale   SubCutaneous Before meals and at bedtime  dextrose 5%. 1000 milliLiter(s) (50 mL/Hr) IV Continuous <Continuous>  dextrose 50% Injectable 12.5 Gram(s) IV Push once  dextrose 50% Injectable 25 Gram(s) IV Push once  dextrose 50% Injectable 25 Gram(s) IV Push once  risperiDONE   Tablet 0.5 milliGRAM(s) Oral daily    MEDICATIONS  (PRN):  diazepam    Tablet 5 milliGRAM(s) Oral every 8 hours PRN agitation  LORazepam   Injectable 2 milliGRAM(s) IV Push every 4 hours PRN seizure or acute agitation  dextrose Gel 1 Dose(s) Oral once PRN Blood Glucose LESS THAN 70 milliGRAM(s)/deciliter  glucagon  Injectable 1 milliGRAM(s) IntraMuscular once PRN Glucose LESS THAN 70 milligrams/deciliter  polyethylene glycol 3350 17 Gram(s) Oral daily PRN Constipation      Exam:  Eyes open spontaneously. Conversational with appropriate sentences.  EOMI. Visual fields full. PERRL 3>2. Face symmetrical.  Full strength throughout.  Finger-nose-finger intact R/L.  Gait normal narrow based.    Oriented x 3  Registration 5/5 low frequency words  Months backwards correct  Named 14 [employer] teams  Recall 5/5 08-27    140  |  99  |  18  ----------------------------<  146<H>  3.9   |  19<L>  |  1.10    Ca    9.8      27 Aug 2017 07:19  Mg     2.4     08-27

## 2017-08-28 NOTE — PROGRESS NOTE ADULT - SUBJECTIVE AND OBJECTIVE BOX
INTERVAL HPI/OVERNIGHT EVENTS:  Patient was seen and examined at bedside. As per nurse and patient, no o/n events, patient resting comfortably. Patient reports mild constipation.  Patient denies: fever, chills, dizziness, weakness, HA, Changes in vision, CP, palpitations, SOB, cough, N/V/D, dysuria, changes in bowel movements, LE edema.    VITAL SIGNS:  T(F): 97.5 (08-28-17 @ 20:53)  HR: 66 (08-28-17 @ 20:53)  BP: 135/65 (08-28-17 @ 20:53)  RR: 16 (08-28-17 @ 20:53)  SpO2: 96% (08-28-17 @ 20:53)  Wt(kg): --    PHYSICAL EXAM:    General: A&Ox3; NAD  Head: NC/AT; PERRL; EOMI; anicteric sclera  Neck: Supple; no JVD  Respiratory: CTA B/L; no wheezes/crackles/rales auscultated w/ good air movement  Cardiovascular: Regular rhythm/rate; S1/S2; no gallops or murmurs auscultated  Gastrointestinal: Soft; NTND w/out rebound tenderness or guarding; bowel sounds normal  Extremities: WWP; no edema or cyanosis; radial/pedal pulses palpable  Neurological:  CNII-XII grossly intact; no obvious focal deficits    MEDICATIONS  (STANDING):  risperiDONE   Tablet 1 milliGRAM(s) Oral at bedtime  methylPREDNISolone sodium succinate IVPB 250 milliGRAM(s) IV Intermittent every 6 hours  pantoprazole    Tablet 40 milliGRAM(s) Oral before breakfast  insulin lispro (HumaLOG) corrective regimen sliding scale   SubCutaneous Before meals and at bedtime  dextrose 5%. 1000 milliLiter(s) (50 mL/Hr) IV Continuous <Continuous>  dextrose 50% Injectable 12.5 Gram(s) IV Push once  dextrose 50% Injectable 25 Gram(s) IV Push once  dextrose 50% Injectable 25 Gram(s) IV Push once  risperiDONE   Tablet 0.5 milliGRAM(s) Oral daily    MEDICATIONS  (PRN):  diazepam    Tablet 5 milliGRAM(s) Oral every 8 hours PRN agitation  LORazepam   Injectable 2 milliGRAM(s) IV Push every 4 hours PRN seizure or acute agitation  dextrose Gel 1 Dose(s) Oral once PRN Blood Glucose LESS THAN 70 milliGRAM(s)/deciliter  glucagon  Injectable 1 milliGRAM(s) IntraMuscular once PRN Glucose LESS THAN 70 milligrams/deciliter  polyethylene glycol 3350 17 Gram(s) Oral daily PRN Constipation      Allergies    No Known Allergies    Intolerances        LABS:    08-27    140  |  99  |  18  ----------------------------<  146<H>  3.9   |  19<L>  |  1.10    Ca    9.8      27 Aug 2017 07:19  Mg     2.4     08-27

## 2017-08-29 VITALS
TEMPERATURE: 98 F | RESPIRATION RATE: 15 BRPM | OXYGEN SATURATION: 98 % | HEART RATE: 71 BPM | SYSTOLIC BLOOD PRESSURE: 135 MMHG | DIASTOLIC BLOOD PRESSURE: 75 MMHG

## 2017-08-29 PROCEDURE — 36415 COLL VENOUS BLD VENIPUNCTURE: CPT

## 2017-08-29 PROCEDURE — 93005 ELECTROCARDIOGRAM TRACING: CPT

## 2017-08-29 PROCEDURE — 80048 BASIC METABOLIC PNL TOTAL CA: CPT

## 2017-08-29 PROCEDURE — 86617 LYME DISEASE ANTIBODY: CPT

## 2017-08-29 PROCEDURE — 99238 HOSP IP/OBS DSCHRG MGMT 30/<: CPT

## 2017-08-29 PROCEDURE — 95951: CPT

## 2017-08-29 PROCEDURE — 80053 COMPREHEN METABOLIC PANEL: CPT

## 2017-08-29 PROCEDURE — 83519 RIA NONANTIBODY: CPT

## 2017-08-29 PROCEDURE — 83735 ASSAY OF MAGNESIUM: CPT

## 2017-08-29 PROCEDURE — 86618 LYME DISEASE ANTIBODY: CPT

## 2017-08-29 PROCEDURE — 85025 COMPLETE CBC W/AUTO DIFF WBC: CPT

## 2017-08-29 RX ORDER — MYCOPHENOLATE MOFETIL 250 MG/1
1 CAPSULE ORAL
Qty: 60 | Refills: 0 | OUTPATIENT
Start: 2017-08-29 | End: 2017-09-28

## 2017-08-29 RX ORDER — RISPERIDONE 4 MG/1
1 TABLET ORAL
Qty: 11 | Refills: 0 | OUTPATIENT
Start: 2017-08-29 | End: 2017-09-09

## 2017-08-29 RX ADMIN — Medication 50 MILLIGRAM(S): at 09:27

## 2017-08-29 RX ADMIN — Medication 50 MILLIGRAM(S): at 06:19

## 2017-08-29 RX ADMIN — PANTOPRAZOLE SODIUM 40 MILLIGRAM(S): 20 TABLET, DELAYED RELEASE ORAL at 06:19

## 2017-08-30 LAB — LYME C6 AB IGG/IGM EIA REFLEX WESTERN BL: SIGNIFICANT CHANGE UP

## 2017-09-01 DIAGNOSIS — G04.81 OTHER ENCEPHALITIS AND ENCEPHALOMYELITIS: ICD-10-CM

## 2017-09-01 DIAGNOSIS — K21.9 GASTRO-ESOPHAGEAL REFLUX DISEASE WITHOUT ESOPHAGITIS: ICD-10-CM

## 2017-09-01 DIAGNOSIS — F28 OTHER PSYCHOTIC DISORDER NOT DUE TO A SUBSTANCE OR KNOWN PHYSIOLOGICAL CONDITION: ICD-10-CM

## 2017-09-03 ENCOUNTER — CLINICAL ADVICE (OUTPATIENT)
Age: 57
End: 2017-09-03

## 2017-09-04 LAB — VOLTAGE-GATED K CHANNEL AB RESULT: 0 PMOL/L — SIGNIFICANT CHANGE UP (ref 0–31)

## 2017-09-07 ENCOUNTER — CLINICAL ADVICE (OUTPATIENT)
Age: 57
End: 2017-09-07

## 2017-09-15 PROBLEM — G04.90 ENCEPHALITIS AND ENCEPHALOMYELITIS, UNSPECIFIED: Chronic | Status: ACTIVE | Noted: 2017-08-24

## 2017-09-19 ENCOUNTER — RX RENEWAL (OUTPATIENT)
Age: 57
End: 2017-09-19

## 2017-09-19 ENCOUNTER — OUTPATIENT (OUTPATIENT)
Dept: OUTPATIENT SERVICES | Facility: HOSPITAL | Age: 57
LOS: 1 days | End: 2017-09-19
Payer: COMMERCIAL

## 2017-09-19 DIAGNOSIS — S46.119A STRAIN OF MUSCLE, FASCIA AND TENDON OF LONG HEAD OF BICEPS, UNSPECIFIED ARM, INITIAL ENCOUNTER: Chronic | ICD-10-CM

## 2017-09-19 PROCEDURE — 70553 MRI BRAIN STEM W/O & W/DYE: CPT | Mod: 26

## 2017-09-19 PROCEDURE — 70553 MRI BRAIN STEM W/O & W/DYE: CPT

## 2017-09-19 PROCEDURE — A9585: CPT

## 2017-09-20 ENCOUNTER — CLINICAL ADVICE (OUTPATIENT)
Age: 57
End: 2017-09-20

## 2017-10-11 ENCOUNTER — CLINICAL ADVICE (OUTPATIENT)
Age: 57
End: 2017-10-11

## 2017-10-16 ENCOUNTER — APPOINTMENT (OUTPATIENT)
Dept: NEUROLOGY | Facility: CLINIC | Age: 57
End: 2017-10-16
Payer: COMMERCIAL

## 2017-10-16 PROCEDURE — 90834 PSYTX W PT 45 MINUTES: CPT

## 2017-10-17 ENCOUNTER — APPOINTMENT (OUTPATIENT)
Dept: NEUROLOGY | Facility: CLINIC | Age: 57
End: 2017-10-17

## 2017-12-07 ENCOUNTER — CLINICAL ADVICE (OUTPATIENT)
Age: 57
End: 2017-12-07

## 2018-04-16 ENCOUNTER — CLINICAL ADVICE (OUTPATIENT)
Age: 58
End: 2018-04-16

## 2018-04-16 RX ORDER — PREDNISONE 5 MG/1
5 TABLET ORAL
Qty: 14 | Refills: 0 | Status: DISCONTINUED | COMMUNITY
Start: 2017-09-19 | End: 2018-02-05

## 2018-04-27 ENCOUNTER — APPOINTMENT (OUTPATIENT)
Dept: NEUROLOGY | Facility: CLINIC | Age: 58
End: 2018-04-27
Payer: COMMERCIAL

## 2018-04-27 ENCOUNTER — LABORATORY RESULT (OUTPATIENT)
Age: 58
End: 2018-04-27

## 2018-04-27 VITALS
OXYGEN SATURATION: 97 % | HEIGHT: 71 IN | WEIGHT: 180 LBS | DIASTOLIC BLOOD PRESSURE: 76 MMHG | TEMPERATURE: 97 F | SYSTOLIC BLOOD PRESSURE: 124 MMHG | BODY MASS INDEX: 25.2 KG/M2 | HEART RATE: 94 BPM

## 2018-04-27 PROCEDURE — 99214 OFFICE O/P EST MOD 30 MIN: CPT

## 2018-04-30 LAB — ERYTHROCYTE [SEDIMENTATION RATE] IN BLOOD BY WESTERGREN METHOD: 2 MM/HR

## 2018-05-01 LAB
25(OH)D3 SERPL-MCNC: 24.7 NG/ML
BASOPHILS # BLD AUTO: 0.01 K/UL
BASOPHILS NFR BLD AUTO: 0.1 %
C3 SERPL-MCNC: 152 MG/DL
C4 SERPL-MCNC: 30 MG/DL
EOSINOPHIL # BLD AUTO: 0.06 K/UL
EOSINOPHIL NFR BLD AUTO: 0.8 %
HCT VFR BLD CALC: 53.3 %
HGB BLD-MCNC: 14.6 G/DL
IMM GRANULOCYTES NFR BLD AUTO: 0.3 %
LYMPHOCYTES # BLD AUTO: 1.9 K/UL
LYMPHOCYTES NFR BLD AUTO: 24.2 %
MAN DIFF?: NORMAL
MCHC RBC-ENTMCNC: 27.4 GM/DL
MCHC RBC-ENTMCNC: 28.1 PG
MCV RBC AUTO: 102.5 FL
MONOCYTES # BLD AUTO: 0.23 K/UL
MONOCYTES NFR BLD AUTO: 2.9 %
NEUTROPHILS # BLD AUTO: 5.63 K/UL
NEUTROPHILS NFR BLD AUTO: 71.7 %
PLATELET # BLD AUTO: 299 K/UL
RBC # BLD: 5.2 M/UL
RBC # FLD: 16.3 %
WBC # FLD AUTO: 7.85 K/UL

## 2018-05-03 LAB
THYROGLOB AB SERPL-ACNC: <20 IU/ML
THYROPEROXIDASE AB SERPL IA-ACNC: 11.2 IU/ML

## 2018-05-07 ENCOUNTER — CLINICAL ADVICE (OUTPATIENT)
Age: 58
End: 2018-05-07

## 2018-05-18 ENCOUNTER — CLINICAL ADVICE (OUTPATIENT)
Age: 58
End: 2018-05-18

## 2018-06-11 ENCOUNTER — TRANSCRIPTION ENCOUNTER (OUTPATIENT)
Age: 58
End: 2018-06-11

## 2018-06-11 ENCOUNTER — INPATIENT (INPATIENT)
Facility: HOSPITAL | Age: 58
LOS: 0 days | Discharge: HOME | DRG: 98 | End: 2018-06-12
Attending: PSYCHIATRY & NEUROLOGY | Admitting: PSYCHIATRY & NEUROLOGY
Payer: COMMERCIAL

## 2018-06-11 VITALS
HEIGHT: 71 IN | RESPIRATION RATE: 15 BRPM | SYSTOLIC BLOOD PRESSURE: 125 MMHG | DIASTOLIC BLOOD PRESSURE: 76 MMHG | WEIGHT: 183.2 LBS | OXYGEN SATURATION: 98 % | HEART RATE: 66 BPM | TEMPERATURE: 98 F

## 2018-06-11 DIAGNOSIS — G04.90 ENCEPHALITIS AND ENCEPHALOMYELITIS, UNSPECIFIED: ICD-10-CM

## 2018-06-11 DIAGNOSIS — R63.8 OTHER SYMPTOMS AND SIGNS CONCERNING FOOD AND FLUID INTAKE: ICD-10-CM

## 2018-06-11 DIAGNOSIS — Z29.9 ENCOUNTER FOR PROPHYLACTIC MEASURES, UNSPECIFIED: ICD-10-CM

## 2018-06-11 DIAGNOSIS — S46.119A STRAIN OF MUSCLE, FASCIA AND TENDON OF LONG HEAD OF BICEPS, UNSPECIFIED ARM, INITIAL ENCOUNTER: Chronic | ICD-10-CM

## 2018-06-11 LAB
ALBUMIN SERPL ELPH-MCNC: 4.7 G/DL — SIGNIFICANT CHANGE UP (ref 3.3–5)
ALP SERPL-CCNC: 74 U/L — SIGNIFICANT CHANGE UP (ref 40–120)
ALT FLD-CCNC: 35 U/L — SIGNIFICANT CHANGE UP (ref 10–45)
ANION GAP SERPL CALC-SCNC: 14 MMOL/L — SIGNIFICANT CHANGE UP (ref 5–17)
APTT BLD: 33.6 SEC — SIGNIFICANT CHANGE UP (ref 27.5–37.4)
AST SERPL-CCNC: 25 U/L — SIGNIFICANT CHANGE UP (ref 10–40)
BILIRUB SERPL-MCNC: 0.4 MG/DL — SIGNIFICANT CHANGE UP (ref 0.2–1.2)
BUN SERPL-MCNC: 14 MG/DL — SIGNIFICANT CHANGE UP (ref 7–23)
CALCIUM SERPL-MCNC: 10.3 MG/DL — SIGNIFICANT CHANGE UP (ref 8.4–10.5)
CHLORIDE SERPL-SCNC: 100 MMOL/L — SIGNIFICANT CHANGE UP (ref 96–108)
CO2 SERPL-SCNC: 27 MMOL/L — SIGNIFICANT CHANGE UP (ref 22–31)
CREAT SERPL-MCNC: 1.13 MG/DL — SIGNIFICANT CHANGE UP (ref 0.5–1.3)
GLUCOSE SERPL-MCNC: 114 MG/DL — HIGH (ref 70–99)
HAV IGM SER-ACNC: SIGNIFICANT CHANGE UP
HBV CORE IGM SER-ACNC: SIGNIFICANT CHANGE UP
HBV SURFACE AG SER-ACNC: SIGNIFICANT CHANGE UP
HCT VFR BLD CALC: 46.5 % — SIGNIFICANT CHANGE UP (ref 39–50)
HCV AB S/CO SERPL IA: 0.2 S/CO — SIGNIFICANT CHANGE UP
HCV AB SERPL-IMP: SIGNIFICANT CHANGE UP
HGB BLD-MCNC: 15.7 G/DL — SIGNIFICANT CHANGE UP (ref 13–17)
INR BLD: 0.92 — SIGNIFICANT CHANGE UP (ref 0.88–1.16)
MAGNESIUM SERPL-MCNC: 2.1 MG/DL — SIGNIFICANT CHANGE UP (ref 1.6–2.6)
MCHC RBC-ENTMCNC: 28 PG — SIGNIFICANT CHANGE UP (ref 27–34)
MCHC RBC-ENTMCNC: 33.8 G/DL — SIGNIFICANT CHANGE UP (ref 32–36)
MCV RBC AUTO: 82.9 FL — SIGNIFICANT CHANGE UP (ref 80–100)
PLATELET # BLD AUTO: 236 K/UL — SIGNIFICANT CHANGE UP (ref 150–400)
POTASSIUM SERPL-MCNC: 3.9 MMOL/L — SIGNIFICANT CHANGE UP (ref 3.5–5.3)
POTASSIUM SERPL-SCNC: 3.9 MMOL/L — SIGNIFICANT CHANGE UP (ref 3.5–5.3)
PROT SERPL-MCNC: 7.4 G/DL — SIGNIFICANT CHANGE UP (ref 6–8.3)
PROTHROM AB SERPL-ACNC: 10.2 SEC — SIGNIFICANT CHANGE UP (ref 9.8–12.7)
RBC # BLD: 5.61 M/UL — SIGNIFICANT CHANGE UP (ref 4.2–5.8)
RBC # FLD: 13.6 % — SIGNIFICANT CHANGE UP (ref 10.3–16.9)
SODIUM SERPL-SCNC: 141 MMOL/L — SIGNIFICANT CHANGE UP (ref 135–145)
WBC # BLD: 7.3 K/UL — SIGNIFICANT CHANGE UP (ref 3.8–10.5)
WBC # FLD AUTO: 7.3 K/UL — SIGNIFICANT CHANGE UP (ref 3.8–10.5)

## 2018-06-11 PROCEDURE — 93010 ELECTROCARDIOGRAM REPORT: CPT

## 2018-06-11 PROCEDURE — 99223 1ST HOSP IP/OBS HIGH 75: CPT

## 2018-06-11 RX ORDER — DIPHENHYDRAMINE HCL 50 MG
25 CAPSULE ORAL ONCE
Qty: 0 | Refills: 0 | Status: COMPLETED | OUTPATIENT
Start: 2018-06-11 | End: 2018-06-11

## 2018-06-11 RX ORDER — LANOLIN ALCOHOL/MO/W.PET/CERES
5 CREAM (GRAM) TOPICAL AT BEDTIME
Qty: 0 | Refills: 0 | Status: DISCONTINUED | OUTPATIENT
Start: 2018-06-11 | End: 2018-06-12

## 2018-06-11 RX ORDER — ACETAMINOPHEN 500 MG
650 TABLET ORAL ONCE
Qty: 0 | Refills: 0 | Status: COMPLETED | OUTPATIENT
Start: 2018-06-11 | End: 2018-06-11

## 2018-06-11 RX ORDER — RITUXIMAB 10 MG/ML
1000 INJECTION, SOLUTION INTRAVENOUS ONCE
Qty: 0 | Refills: 0 | Status: COMPLETED | OUTPATIENT
Start: 2018-06-11 | End: 2018-06-11

## 2018-06-11 RX ORDER — ACETAMINOPHEN 500 MG
650 TABLET ORAL EVERY 6 HOURS
Qty: 0 | Refills: 0 | Status: DISCONTINUED | OUTPATIENT
Start: 2018-06-11 | End: 2018-06-12

## 2018-06-11 RX ORDER — POTASSIUM CHLORIDE 20 MEQ
10 PACKET (EA) ORAL ONCE
Qty: 0 | Refills: 0 | Status: COMPLETED | OUTPATIENT
Start: 2018-06-11 | End: 2018-06-11

## 2018-06-11 RX ORDER — DIPHENHYDRAMINE HCL 50 MG
50 CAPSULE ORAL ONCE
Qty: 0 | Refills: 0 | Status: DISCONTINUED | OUTPATIENT
Start: 2018-06-11 | End: 2018-06-11

## 2018-06-11 RX ADMIN — Medication 5 MILLIGRAM(S): at 23:53

## 2018-06-11 RX ADMIN — Medication 100 MILLIGRAM(S): at 17:24

## 2018-06-11 RX ADMIN — Medication 25 MILLIGRAM(S): at 17:24

## 2018-06-11 RX ADMIN — Medication 650 MILLIGRAM(S): at 17:24

## 2018-06-11 RX ADMIN — RITUXIMAB 250 MILLIGRAM(S): 10 INJECTION, SOLUTION INTRAVENOUS at 18:46

## 2018-06-11 NOTE — H&P ADULT - HISTORY OF PRESENT ILLNESS
A 56 yo M with PMH of Capgras Syndrome, Limbic Encephalitis a/w VGKC Antibody, Paraneoplastic Syndrome presents to [facility] for     Patient has a longstanding history fo paranoia and anxiety. He felt that people were following or watching him. At times trying to kill him. His PMD started him on Zoloft but 3 days later, he became "insane", much worse, with aggressiveness. He went to [facility] for help. He was admitted involuntarily for 3 days on Haldol, Seroquel and Lorazepam. The neurologist present at the hospital noticed dystonic movements and sent labs to [facility] which returned positive for VGKC AB 10x normal limit. No classic faciobrachial dystonic seizures. Prior to this, patient with a tick bite March/April 2017 with a rash around it which he took antibiotics for.     Patient had 3 courses of IVIG in [place] and seemed to stabilize his behavior. However, wife was concerned that paranoia, anxiety, rage attacks returned. He presented with odd behavior. VGKC level abnormally high again at 121pmol/L. Patient admitted for rituximab infusion given refractory to solumedrol, and IVIG. A 56 yo M with PMH of Capgras Syndrome, Limbic Encephalitis a/w VGKC Antibody, Paraneoplastic Syndrome presents to [facility] for Rituximab Infusion.     Patient has a longstanding history fo paranoia and anxiety. He felt that people were following or watching him. At times trying to kill him. His PMD started him on Zoloft but 3 days later, he became "insane", much worse, with aggressiveness. He went to [facility] for help. He was admitted involuntarily for 3 days on Haldol, Seroquel and Lorazepam. The neurologist present at the hospital noticed dystonic movements and sent labs to [facility] which returned positive for VGKC AB 10x normal limit. No classic faciobrachial dystonic seizures. Prior to this, patient with a tick bite March/April 2017 with a rash around it which he took antibiotics for. Patient had 3 courses of IVIG in [place] and seemed to stabilize his behavior. However, wife was concerned that paranoia, anxiety, rage attacks returned. He presented with odd behavior. VGKC level abnormally high again at 121pmol/L. Patient admitted for rituximab infusion given refractory to solumedrol, and IVIG. A 58 yo M with PMH of Capgras Syndrome, Limbic Encephalitis a/w VGKC Antibody, Paraneoplastic Syndrome presents to [facility] for Rituximab Infusion. Patient has a longstanding history fo paranoia and anxiety. He felt that people were following or watching him. At times trying to kill him. His PMD started him on Zoloft but 3 days later, he became "insane", much worse, with aggressiveness. He went to [facility] for help. He was admitted involuntarily for 3 days on Haldol, Seroquel and Lorazepam. The neurologist present at the hospital noticed dystonic movements and sent labs to [facility] which returned positive for VGKC AB 10x normal limit. No classic faciobrachial dystonic seizures. Prior to this, patient with a tick bite March/April 2017 with a rash around it which he took antibiotics for. Patient had 3 courses of IVIG in [place] and seemed to stabilize his behavior. However, wife was concerned that paranoia, anxiety, rage attacks returned. He presented with odd behavior. VGKC level abnormally high again at 121pmol/L. Patient admitted for rituximab infusion given refractory to solumedrol, and IVIG.

## 2018-06-11 NOTE — H&P ADULT - NSHPPHYSICALEXAM_GEN_ALL_CORE
PHYSICAL EXAM:    Constitutional: NAD, AAOx3, comfortable in bed.   Respiratory: Normal rate, rhythm, depth, effort. CTAB. No w/r/r.   Cardiovascular: RRR, normal S1 and S2, no m/r/g.   Gastrointestinal: +BS, soft NTND.   Extremities: wwp, no edema.   Vascular: Pulses equal and strong throughout.   Neurological: Cranial nerves grossly intact, strength and sensation intact throughout.   Skin: No gross skin abnormalities. PHYSICAL EXAM:    Constitutional: NAD, AAOx3, comfortable in bed.   HEENT: EOMI, PERRL, anicteric, MMM, neck supple, no LAD, no JVD  Respiratory: Normal rate, rhythm, depth, effort. CTAB. No w/r/r.   Cardiovascular: RRR, normal S1 and S2, no m/r/g.   Gastrointestinal: +BS, soft NTND.   Extremities: wwp, no edema.   Vascular: Pulses equal and strong throughout.   Neurological: Cranial nerves grossly intact, strength and sensation intact throughout.   Skin: No gross skin abnormalities.

## 2018-06-11 NOTE — DISCHARGE NOTE ADULT - PATIENT PORTAL LINK FT
You can access the [facility] Patient Portal, offered by [facility], by registering with the following website: [email or web address]

## 2018-06-11 NOTE — DISCHARGE NOTE ADULT - CARE PLAN
Principal Discharge DX:	Limbic encephalitis associated with VGKC antibody  Goal:	to reduce sequelae of disease  Assessment and plan of treatment:	You came to the hospital for a rituximab infusion to treat your encephalitis from your VGKC antibodies. Your infusion was successful. Please continue to follow up with your neurologist.

## 2018-06-11 NOTE — DISCHARGE NOTE ADULT - HOSPITAL COURSE
Patient is a 57 year old gentleman with past medical history of Capgras Syndrome, Limbic Encephalitis a/w VGKC Antibody, Paraneoplastic Syndrome who was directly admitted to [facility] on 6/11 for rituximab infusion. Infusion was successful, no complications incurred. Patient was stable for discharge the following day with close follow up with his neurologist. Patient is a 57 year old gentleman with past medical history of Capgras Syndrome, Limbic Encephalitis a/w VGKC Antibody, refractory to both steroid infusion and IVIg,  who was directly admitted to [facility] on 6/11 for supervised rituximab infusion. Pt appeared to be calm, though just weeks ago exhibited erratic behavior at his son's wedding, attempting to pull a speaker oiff the wall  because he felt the music was too loud.  He was mildly paranoid or at least odd, as on admission, when introduced to Dr Prieto by Dr. Dupree, he was a bit aggressive about asking who he was and whether he worked in the hospital or why he was there twice.   Infusion was run overnight with pretreatment, and was successful, no complications incurred. Patient was stable for discharge the following day with close follow up with his neurologist.

## 2018-06-11 NOTE — H&P ADULT - PROBLEM SELECTOR PLAN 1
Patient presenting with acute flare of auto-antibody encephalitis. Previously IV Solumedrol has worked for reducing inflammation; however, patient has not had relief using steroids at home.  -IV Solumedrol 250mg q6h for 5 days (day 4/5)- will receive last dose as 500mg  -vEEG monitoring for seizure activity  -Ativan 2 mg Q4 hour PRN for seizures >2 minutes, or agitation  -Valium 5mg q8h for agitation   -Risperdal 1mg tonight for sleep and 0.5mg during the afternoons  -Repeat VGKC antibody, serum to be drawn. Patient presenting with continued rage attacks, paranoia, anxiety as per wife s/p IVIG and steroids in the past with minimal relief. Previously IV Solumedrol has worked for reducing inflammation; however, patient has not had relief using steroids at home. VGKC level abnormally high at 121pmol/L. Now here for Rituximab infusion.   - Premedicate with Tylenol 650mg oral, Solumedrol 500mg IV, Benadryl 25mg IV.   - Rituximab 1g slow infusion  - Hematology Oncology to place chemotherapy orders  - c/w Diazepam 5mg oral TID PRN  - f/u Paraneoplastic Panel  - f/u Dr. Dupree recommendations

## 2018-06-11 NOTE — H&P ADULT - ASSESSMENT
A 56 yo M with PMH of Capgras Syndrome, Limbic Encephalitis a/w VGKC Antibody, Paraneoplastic Syndrome presents to [facility] for Rituximab Infusion.

## 2018-06-11 NOTE — DISCHARGE NOTE ADULT - CARE PROVIDER_API CALL
Pablo Dupree (MD), Clinical Neurophysiology; EEGEpilepsy; Neurology; Sleep Medicine  [address] [place] [address]  Phone: [phone]  Fax: [phone]

## 2018-06-11 NOTE — DISCHARGE NOTE ADULT - PLAN OF CARE
to reduce sequelae of disease You came to the hospital for a rituximab infusion to treat your encephalitis from your VGKC antibodies. Your infusion was successful. Please continue to follow up with your neurologist.

## 2018-06-12 VITALS
RESPIRATION RATE: 16 BRPM | OXYGEN SATURATION: 96 % | TEMPERATURE: 98 F | SYSTOLIC BLOOD PRESSURE: 150 MMHG | HEART RATE: 95 BPM | DIASTOLIC BLOOD PRESSURE: 82 MMHG

## 2018-06-12 LAB
ANION GAP SERPL CALC-SCNC: 14 MMOL/L — SIGNIFICANT CHANGE UP (ref 5–17)
BUN SERPL-MCNC: 17 MG/DL — SIGNIFICANT CHANGE UP (ref 7–23)
CALCIUM SERPL-MCNC: 10.1 MG/DL — SIGNIFICANT CHANGE UP (ref 8.4–10.5)
CHLORIDE SERPL-SCNC: 103 MMOL/L — SIGNIFICANT CHANGE UP (ref 96–108)
CO2 SERPL-SCNC: 21 MMOL/L — LOW (ref 22–31)
CREAT SERPL-MCNC: 0.88 MG/DL — SIGNIFICANT CHANGE UP (ref 0.5–1.3)
GLUCOSE SERPL-MCNC: 155 MG/DL — HIGH (ref 70–99)
HCT VFR BLD CALC: 47.1 % — SIGNIFICANT CHANGE UP (ref 39–50)
HGB BLD-MCNC: 16.4 G/DL — SIGNIFICANT CHANGE UP (ref 13–17)
LYMPHOCYTES # BLD AUTO: 5 % — LOW (ref 13–44)
MAGNESIUM SERPL-MCNC: 1.9 MG/DL — SIGNIFICANT CHANGE UP (ref 1.6–2.6)
MCHC RBC-ENTMCNC: 28.6 PG — SIGNIFICANT CHANGE UP (ref 27–34)
MCHC RBC-ENTMCNC: 34.8 G/DL — SIGNIFICANT CHANGE UP (ref 32–36)
MCV RBC AUTO: 82.2 FL — SIGNIFICANT CHANGE UP (ref 80–100)
NEUTROPHILS NFR BLD AUTO: 85 % — HIGH (ref 43–77)
PLATELET # BLD AUTO: 263 K/UL — SIGNIFICANT CHANGE UP (ref 150–400)
POTASSIUM SERPL-MCNC: 4.5 MMOL/L — SIGNIFICANT CHANGE UP (ref 3.5–5.3)
POTASSIUM SERPL-SCNC: 4.5 MMOL/L — SIGNIFICANT CHANGE UP (ref 3.5–5.3)
RBC # BLD: 5.73 M/UL — SIGNIFICANT CHANGE UP (ref 4.2–5.8)
RBC # FLD: 13.3 % — SIGNIFICANT CHANGE UP (ref 10.3–16.9)
SODIUM SERPL-SCNC: 138 MMOL/L — SIGNIFICANT CHANGE UP (ref 135–145)
WBC # BLD: 7.9 K/UL — SIGNIFICANT CHANGE UP (ref 3.8–10.5)
WBC # FLD AUTO: 7.9 K/UL — SIGNIFICANT CHANGE UP (ref 3.8–10.5)

## 2018-06-12 PROCEDURE — 93005 ELECTROCARDIOGRAM TRACING: CPT

## 2018-06-12 PROCEDURE — 80048 BASIC METABOLIC PNL TOTAL CA: CPT

## 2018-06-12 PROCEDURE — 99238 HOSP IP/OBS DSCHRG MGMT 30/<: CPT

## 2018-06-12 PROCEDURE — 85730 THROMBOPLASTIN TIME PARTIAL: CPT

## 2018-06-12 PROCEDURE — 85025 COMPLETE CBC W/AUTO DIFF WBC: CPT

## 2018-06-12 PROCEDURE — 85610 PROTHROMBIN TIME: CPT

## 2018-06-12 PROCEDURE — 83789 MASS SPECTROMETRY QUAL/QUAN: CPT

## 2018-06-12 PROCEDURE — 83735 ASSAY OF MAGNESIUM: CPT

## 2018-06-12 PROCEDURE — 80074 ACUTE HEPATITIS PANEL: CPT

## 2018-06-12 PROCEDURE — 99222 1ST HOSP IP/OBS MODERATE 55: CPT

## 2018-06-12 PROCEDURE — 36415 COLL VENOUS BLD VENIPUNCTURE: CPT

## 2018-06-12 PROCEDURE — 80053 COMPREHEN METABOLIC PANEL: CPT

## 2018-06-12 PROCEDURE — 85027 COMPLETE CBC AUTOMATED: CPT

## 2018-06-12 NOTE — CONSULT NOTE ADULT - SUBJECTIVE AND OBJECTIVE BOX
HPI:  A 58 yo M with PMH of Capgras Syndrome, Limbic Encephalitis a/w VGKC Antibody, Paraneoplastic Syndrome presents to [facility] for Rituximab Infusion. Patient has a longstanding history fo paranoia and anxiety. He felt that people were following or watching him. At times trying to kill him. His PMD started him on Zoloft but 3 days later, he became "insane", much worse, with aggressiveness. He went to [facility] for help. He was admitted involuntarily for 3 days on Haldol, Seroquel and Lorazepam. The neurologist present at the hospital noticed dystonic movements and sent labs to [facility] which returned positive for VGKC AB 10x normal limit. No classic faciobrachial dystonic seizures. Prior to this, patient with a tick bite March/April 2017 with a rash around it which he took antibiotics for. Patient had 3 courses of IVIG in [place] and seemed to stabilize his behavior. However, wife was concerned that paranoia, anxiety, rage attacks returned. He presented with odd behavior. VGKC level abnormally high again at 121pmol/L. Patient admitted for rituximab infusion given refractory to solumedrol, and IVIG. (11 Jun 2018 12:12)      PAST MEDICAL & SURGICAL HISTORY:  Lyme disease  Capgras syndrome  Limbic encephalitis associated with VGKC antibody  Biceps tendon tear       Review of Systems:  · General	negative  · Skin/Breast	negative  · Ophthalmologic	negative  · ENMT	negative  · Respiratory and Thorax	negative  · Cardiovascular	negative  · Gastrointestinal	negative  · Genitourinary	negative  · Musculoskeletal Comments	negative  · Neurological	negative      MEDICATIONS  (STANDING):  melatonin 5 milliGRAM(s) Oral at bedtime    MEDICATIONS  (PRN):  acetaminophen   Tablet. 650 milliGRAM(s) Oral every 6 hours PRN Mild Pain (1 - 3)  diazepam    Tablet 5 milliGRAM(s) Oral three times a day PRN Anxiety      Allergies    No Known Allergies    Intolerances        SOCIAL HISTORY:    FAMILY HISTORY:  No pertinent family history in first degree relatives      Vital Signs Last 24 Hrs  T(C): 36.8 (12 Jun 2018 05:48), Max: 36.8 (12 Jun 2018 05:48)  T(F): 98.2 (12 Jun 2018 05:48), Max: 98.2 (12 Jun 2018 05:48)  HR: 75 (12 Jun 2018 05:48) (65 - 95)  BP: 144/70 (12 Jun 2018 05:48) (118/68 - 144/70)  BP(mean): --  RR: 14 (12 Jun 2018 05:48) (14 - 16)  SpO2: 98% (12 Jun 2018 05:48) (96% - 98%)     Physical Exam:  · Constitutional	detailed exam  · Constitutional Details	well-developed; well-groomed  · Eyes	EOMI; PERRL; no drainage or redness  · ENMT Comments	dry mucous membranes  · Respiratory	detailed exam  · Respiratory Comments	normal breath sounds at the lung bases bilaterally  · Cardiovascular	Regular rate & rhythm, normal S1, S2; no murmurs, gallops or rubs; no S3, S4  · Abd-Soft non tender  ·Ext-no edema, clubbing or cyanosis      LABS:                        16.4   7.9   )-----------( 263      ( 12 Jun 2018 06:54 )             47.1     06-12    138  |  103  |  17  ----------------------------<  155<H>  4.5   |  21<L>  |  0.88    Ca    10.1      12 Jun 2018 06:54  Mg     1.9     06-12    TPro  7.4  /  Alb  4.7  /  TBili  0.4  /  DBili  x   /  AST  25  /  ALT  35  /  AlkPhos  74  06-11    PT/INR - ( 11 Jun 2018 14:45 )   PT: 10.2 sec;   INR: 0.92          PTT - ( 11 Jun 2018 14:45 )  PTT:33.6 sec      RADIOLOGY & ADDITIONAL STUDIES:

## 2018-06-13 DIAGNOSIS — R51 HEADACHE: ICD-10-CM

## 2018-06-14 PROBLEM — R51 CHRONIC NONINTRACTABLE HEADACHE, UNSPECIFIED HEADACHE TYPE: Status: ACTIVE | Noted: 2018-06-14

## 2018-06-15 DIAGNOSIS — F41.9 ANXIETY DISORDER, UNSPECIFIED: ICD-10-CM

## 2018-06-15 DIAGNOSIS — G04.81 OTHER ENCEPHALITIS AND ENCEPHALOMYELITIS: ICD-10-CM

## 2018-06-15 DIAGNOSIS — A69.20 LYME DISEASE, UNSPECIFIED: ICD-10-CM

## 2018-06-15 DIAGNOSIS — F22 DELUSIONAL DISORDERS: ICD-10-CM

## 2018-06-26 LAB — PARANEOPLASTIC AB PNL SER: ABNORMAL

## 2018-06-28 PROBLEM — F22 DELUSIONAL DISORDERS: Chronic | Status: ACTIVE | Noted: 2018-06-11

## 2018-06-28 PROBLEM — A69.20 LYME DISEASE, UNSPECIFIED: Chronic | Status: ACTIVE | Noted: 2018-06-11

## 2018-06-29 ENCOUNTER — APPOINTMENT (OUTPATIENT)
Dept: INFUSION THERAPY | Facility: HOSPITAL | Age: 58
End: 2018-06-29

## 2018-06-29 ENCOUNTER — OUTPATIENT (OUTPATIENT)
Dept: OUTPATIENT SERVICES | Facility: HOSPITAL | Age: 58
LOS: 1 days | End: 2018-06-29
Payer: COMMERCIAL

## 2018-06-29 VITALS
OXYGEN SATURATION: 99 % | TEMPERATURE: 98 F | RESPIRATION RATE: 18 BRPM | HEART RATE: 67 BPM | DIASTOLIC BLOOD PRESSURE: 77 MMHG | SYSTOLIC BLOOD PRESSURE: 146 MMHG

## 2018-06-29 VITALS
SYSTOLIC BLOOD PRESSURE: 144 MMHG | RESPIRATION RATE: 18 BRPM | TEMPERATURE: 97 F | DIASTOLIC BLOOD PRESSURE: 78 MMHG | OXYGEN SATURATION: 99 % | HEART RATE: 80 BPM

## 2018-06-29 DIAGNOSIS — G04.90 ENCEPHALITIS AND ENCEPHALOMYELITIS, UNSPECIFIED: ICD-10-CM

## 2018-06-29 DIAGNOSIS — S46.119A STRAIN OF MUSCLE, FASCIA AND TENDON OF LONG HEAD OF BICEPS, UNSPECIFIED ARM, INITIAL ENCOUNTER: Chronic | ICD-10-CM

## 2018-06-29 PROCEDURE — 96375 TX/PRO/DX INJ NEW DRUG ADDON: CPT

## 2018-06-29 PROCEDURE — 96413 CHEMO IV INFUSION 1 HR: CPT

## 2018-06-29 PROCEDURE — 96415 CHEMO IV INFUSION ADDL HR: CPT

## 2018-06-29 RX ORDER — RITUXIMAB 10 MG/ML
1000 INJECTION, SOLUTION INTRAVENOUS ONCE
Qty: 0 | Refills: 0 | Status: COMPLETED | OUTPATIENT
Start: 2018-06-29 | End: 2018-06-29

## 2018-06-29 RX ORDER — DIPHENHYDRAMINE HCL 50 MG
25 CAPSULE ORAL ONCE
Qty: 0 | Refills: 0 | Status: COMPLETED | OUTPATIENT
Start: 2018-06-29 | End: 2018-06-29

## 2018-06-29 RX ORDER — DIPHENHYDRAMINE HCL 50 MG
25 CAPSULE ORAL ONCE
Qty: 0 | Refills: 0 | Status: DISCONTINUED | OUTPATIENT
Start: 2018-06-29 | End: 2018-07-14

## 2018-06-29 RX ORDER — ACETAMINOPHEN 500 MG
325 TABLET ORAL ONCE
Qty: 0 | Refills: 0 | Status: DISCONTINUED | OUTPATIENT
Start: 2018-06-29 | End: 2018-07-14

## 2018-06-29 RX ORDER — ACETAMINOPHEN 500 MG
650 TABLET ORAL ONCE
Qty: 0 | Refills: 0 | Status: COMPLETED | OUTPATIENT
Start: 2018-06-29 | End: 2018-06-29

## 2018-06-29 RX ADMIN — RITUXIMAB 250 MILLIGRAM(S): 10 INJECTION, SOLUTION INTRAVENOUS at 11:29

## 2018-06-29 RX ADMIN — Medication 650 MILLIGRAM(S): at 11:27

## 2018-06-29 RX ADMIN — Medication 151.5 MILLIGRAM(S): at 10:53

## 2018-07-02 ENCOUNTER — APPOINTMENT (OUTPATIENT)
Dept: NEUROLOGY | Facility: CLINIC | Age: 58
End: 2018-07-02

## 2018-08-01 ENCOUNTER — OTHER (OUTPATIENT)
Age: 58
End: 2018-08-01

## 2018-08-15 ENCOUNTER — APPOINTMENT (OUTPATIENT)
Dept: NEUROLOGY | Facility: CLINIC | Age: 58
End: 2018-08-15
Payer: COMMERCIAL

## 2018-08-15 VITALS
HEART RATE: 80 BPM | SYSTOLIC BLOOD PRESSURE: 146 MMHG | WEIGHT: 181 LBS | BODY MASS INDEX: 25.34 KG/M2 | OXYGEN SATURATION: 96 % | DIASTOLIC BLOOD PRESSURE: 81 MMHG | HEIGHT: 71 IN

## 2018-08-15 PROCEDURE — 99214 OFFICE O/P EST MOD 30 MIN: CPT

## 2018-08-15 RX ORDER — DIAZEPAM 5 MG/1
5 TABLET ORAL
Qty: 30 | Refills: 0 | Status: DISCONTINUED | COMMUNITY
Start: 2018-05-18 | End: 2018-08-15

## 2018-08-15 RX ORDER — OLANZAPINE 2.5 MG/1
2.5 TABLET, FILM COATED ORAL
Qty: 120 | Refills: 2 | Status: DISCONTINUED | COMMUNITY
Start: 2017-09-19 | End: 2018-08-15

## 2018-08-15 RX ORDER — RISPERIDONE 1 MG/1
1 TABLET, FILM COATED ORAL TWICE DAILY
Qty: 60 | Refills: 0 | Status: DISCONTINUED | COMMUNITY
Start: 2017-09-08 | End: 2018-08-15

## 2018-08-15 RX ORDER — DOXYCYCLINE HYCLATE 100 MG/1
100 TABLET ORAL TWICE DAILY
Qty: 42 | Refills: 0 | Status: DISCONTINUED | COMMUNITY
Start: 2017-07-07 | End: 2018-08-15

## 2018-08-16 LAB
A-TUMOR NECROSIS FACT SERPL-MCNC: <5 PG/ML
ALBUMIN SERPL ELPH-MCNC: 5.2 G/DL
ALP BLD-CCNC: 85 U/L
ALT SERPL-CCNC: 28 U/L
ANION GAP SERPL CALC-SCNC: 15 MMOL/L
AST SERPL-CCNC: 22 U/L
BASOPHILS # BLD AUTO: 0.03 K/UL
BASOPHILS NFR BLD AUTO: 0.4 %
BILIRUB SERPL-MCNC: 0.7 MG/DL
BUN SERPL-MCNC: 22 MG/DL
C3 SERPL-MCNC: 152 MG/DL
C4 SERPL-MCNC: 36 MG/DL
CALCIUM SERPL-MCNC: 10.5 MG/DL
CHLORIDE SERPL-SCNC: 98 MMOL/L
CO2 SERPL-SCNC: 28 MMOL/L
CREAT SERPL-MCNC: 1.93 MG/DL
CRP SERPL-MCNC: 0.11 MG/DL
EOSINOPHIL # BLD AUTO: 0.11 K/UL
EOSINOPHIL NFR BLD AUTO: 1.4 %
ERYTHROCYTE [SEDIMENTATION RATE] IN BLOOD BY WESTERGREN METHOD: 13 MM/HR
GAD65 AB SER-MCNC: 0.09 NMOL/L
GLUCOSE SERPL-MCNC: 106 MG/DL
HCT VFR BLD CALC: 50.2 %
HGB BLD-MCNC: 16.8 G/DL
IL10 SERPL-MCNC: <5 PG/ML
IL12 SERPL-MCNC: <5 PG/ML
IL13 SERPL-MCNC: 5 PG/ML
IL2 SERPL-MCNC: 688 PG/ML
IL2 SERPL-MCNC: <5 PG/ML
IL4 SERPL-MCNC: <5 PG/ML
IL6 SERPL-MCNC: 11 PG/ML
IL8 SERPL-MCNC: 22 PG/ML
IMM GRANULOCYTES NFR BLD AUTO: 0.4 %
INTERFERON GAMMA: <5 PG/ML
INTERLEUKIN 1 BETA: 5 PG/ML
INTERLEUKIN 17: <5 PG/ML
INTERLEUKIN 5: 5 PG/ML
LYMPHOCYTES # BLD AUTO: 1.4 K/UL
LYMPHOCYTES NFR BLD AUTO: 17.9 %
MAN DIFF?: NORMAL
MCHC RBC-ENTMCNC: 28.4 PG
MCHC RBC-ENTMCNC: 33.5 GM/DL
MCV RBC AUTO: 84.9 FL
MONOCYTES # BLD AUTO: 0.49 K/UL
MONOCYTES NFR BLD AUTO: 6.3 %
NEUTROPHILS # BLD AUTO: 5.75 K/UL
NEUTROPHILS NFR BLD AUTO: 73.6 %
PARANEOPLASTIC AB PNL SER: ABNORMAL
PLATELET # BLD AUTO: 273 K/UL
POTASSIUM SERPL-SCNC: 4.1 MMOL/L
PROT SERPL-MCNC: 7.8 G/DL
RBC # BLD: 5.91 M/UL
RBC # FLD: 14 %
SODIUM SERPL-SCNC: 141 MMOL/L
VGKC AB SER-SCNC: 121 PMOL/L
WBC # FLD AUTO: 7.81 K/UL

## 2018-08-20 LAB
A-TUMOR NECROSIS FACT SERPL-MCNC: <5 PG/ML
IL10 SERPL-MCNC: <5 PG/ML
IL12 SERPL-MCNC: <5 PG/ML
IL13 SERPL-MCNC: <5 PG/ML
IL2 SERPL-MCNC: 324 PG/ML
IL2 SERPL-MCNC: <5 PG/ML
IL4 SERPL-MCNC: <5 PG/ML
IL6 SERPL-MCNC: <5 PG/ML
IL8 SERPL-MCNC: <5 PG/ML
INTERFERON GAMMA: <5 PG/ML
INTERLEUKIN 1 BETA: <5 PG/ML
INTERLEUKIN 17: <5 PG/ML
INTERLEUKIN 5: <5 PG/ML
THYROGLOB AB SERPL-ACNC: <20 IU/ML
THYROPEROXIDASE AB SERPL IA-ACNC: <10 IU/ML

## 2018-08-21 LAB — NMDA RECEPTOR AB N-METHYL-D-ASPARTATE RECEPTOR AB IGG, SERUM WITH REFLEX TO TITER: NORMAL

## 2018-08-22 LAB — GAD65 AB SER-MCNC: 0 NMOL/L

## 2018-08-29 LAB
PARANEOPLASTIC AB PNL SER: NORMAL
VGKC AB SER-SCNC: 4 PMOL/L

## 2018-09-13 ENCOUNTER — RX RENEWAL (OUTPATIENT)
Age: 58
End: 2018-09-13

## 2018-10-17 ENCOUNTER — RX RENEWAL (OUTPATIENT)
Age: 58
End: 2018-10-17

## 2018-10-26 ENCOUNTER — APPOINTMENT (OUTPATIENT)
Dept: NEUROLOGY | Facility: CLINIC | Age: 58
End: 2018-10-26
Payer: COMMERCIAL

## 2018-10-26 VITALS
SYSTOLIC BLOOD PRESSURE: 156 MMHG | DIASTOLIC BLOOD PRESSURE: 81 MMHG | HEART RATE: 76 BPM | OXYGEN SATURATION: 98 % | WEIGHT: 181 LBS | BODY MASS INDEX: 25.34 KG/M2 | HEIGHT: 71 IN

## 2018-10-26 DIAGNOSIS — F32.9 MAJOR DEPRESSIVE DISORDER, SINGLE EPISODE, UNSPECIFIED: ICD-10-CM

## 2018-10-26 PROCEDURE — 99214 OFFICE O/P EST MOD 30 MIN: CPT

## 2018-12-13 NOTE — DISCHARGE NOTE ADULT - PLAN OF CARE
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 You received IV steroids for 5 days. You brain activity was monitored with video EEG and no seizure activities were detected. You will continue with your home dose of seizure medications You had reported recent episodes of visual disturbances, seen bright colors, poor sleep, and recurrent headaches. This is likely due to your autoimmune encephalitis. 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 You received IV steroids for 5 days. You brain activity was monitored with video EEG and no seizure activities were detected. You will follow up with Dr. Dupree as outpatient You had reported recent episodes of visual disturbances, seen bright colors, poor sleep, and recurrent headaches. This is likely due to your autoimmune encephalitis.  You did not have any of these episodes during your admission. 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 You received IV steroids for 5 days. You brain activity was monitored with video EEG and no seizure activity was captured. You will follow up with Dr. Dupree as outpatient. 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 You received IV steroids for 5 days. You brain activity was monitored with video EEG and no seizure activity was captured. You will follow up with Dr. Dupree as outpatient.    We are putting you on a tapering dose of prednisone, which means that we will slowly decrease the dose over several weeks.  For the first 5 days you will take 60mg daily, followed by 50mg for the following 5, 40mg for following 5 and so on.  Day 1-5: 60mg  Day 6-10: 50mg  Day 11-15: 40mg  Day 16-20: 30mg  Day 21-25: 20mg  Day 26-30:10mg  Day 31: STOP    We are also placing you on Celcept 500mg two times per day.    We are placing you on risperdol 1mg daily for one week followed by 0.5mg thereafter.  You should call Dr. Dupree 2 weeks after discharge to follow up regarding whether he would like you to continue taking risperdol.  You should also schedule an appointment with Dr. Dupree one month after discharge.  If your symptoms worsen, please call Dr. Dupree immediately or go to the emergency room. patient You had reported recent episodes of visual disturbances, seen bright colors, poor sleep, and recurrent headaches. 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  This is likely due to your autoimmune encephalitis.  These episodes/complaints resolved within days of your admission.

## 2019-01-22 NOTE — DISCHARGE NOTE ADULT - IF YOU ARE A SMOKER, IT IS IMPORTANT FOR YOUR HEALTH TO STOP SMOKING. PLEASE BE AWARE THAT SECOND HAND SMOKE IS ALSO HARMFUL.
Case Management Discharge Note    Final Note: Spoke with patient and wife at bedside regarding discharge plan.  Patient reports that he is going home today and has an Oxygen tank for transport.  No discharge needs noted.  Patient plan is to discharge home today and follow up at the [facility] via car with family to transport.     Destination      No service has been selected for the patient.      Durable Medical Equipment      No service has been selected for the patient.      Dialysis/Infusion      No service has been selected for the patient.      Home Medical Care      No service has been selected for the patient.      Community Resources      No service has been selected for the patient.             Final Discharge Disposition Code: 01 - home or self-care   Statement Selected

## 2019-02-15 ENCOUNTER — CLINICAL ADVICE (OUTPATIENT)
Age: 59
End: 2019-02-15

## 2019-04-17 LAB
ALBUMIN MFR SERPL ELPH: 65.3 %
ALBUMIN SERPL ELPH-MCNC: 5.1 G/DL
ALBUMIN SERPL-MCNC: 4.6 G/DL
ALBUMIN/GLOB SERPL: 1.8 RATIO
ALP BLD-CCNC: 82 U/L
ALPHA1 GLOB MFR SERPL ELPH: 3.7 %
ALPHA1 GLOB SERPL ELPH-MCNC: 0.3 G/DL
ALPHA2 GLOB MFR SERPL ELPH: 9 %
ALPHA2 GLOB SERPL ELPH-MCNC: 0.6 G/DL
ALT SERPL-CCNC: 30 U/L
ANION GAP SERPL CALC-SCNC: 15 MMOL/L
AST SERPL-CCNC: 23 U/L
B-GLOBULIN MFR SERPL ELPH: 12.3 %
B-GLOBULIN SERPL ELPH-MCNC: 0.9 G/DL
BASOPHILS # BLD AUTO: 0.05 K/UL
BASOPHILS NFR BLD AUTO: 0.9 %
BILIRUB SERPL-MCNC: 0.4 MG/DL
BUN SERPL-MCNC: 16 MG/DL
CALCIUM SERPL-MCNC: 10.3 MG/DL
CHLORIDE SERPL-SCNC: 103 MMOL/L
CO2 SERPL-SCNC: 24 MMOL/L
CREAT SERPL-MCNC: 1.16 MG/DL
EOSINOPHIL # BLD AUTO: 0.06 K/UL
EOSINOPHIL NFR BLD AUTO: 1.1 %
ERYTHROCYTE [SEDIMENTATION RATE] IN BLOOD BY WESTERGREN METHOD: 10 MM/HR
GAMMA GLOB FLD ELPH-MCNC: 0.7 G/DL
GAMMA GLOB MFR SERPL ELPH: 9.7 %
GLUCOSE SERPL-MCNC: 100 MG/DL
HCT VFR BLD CALC: 48.8 %
HGB BLD-MCNC: 16.1 G/DL
IMM GRANULOCYTES NFR BLD AUTO: 0.4 %
INTERPRETATION SERPL IEP-IMP: NORMAL
LYMPHOCYTES # BLD AUTO: 1.47 K/UL
LYMPHOCYTES NFR BLD AUTO: 25.9 %
MAN DIFF?: NORMAL
MCHC RBC-ENTMCNC: 28.3 PG
MCHC RBC-ENTMCNC: 33 GM/DL
MCV RBC AUTO: 85.9 FL
MONOCYTES # BLD AUTO: 0.52 K/UL
MONOCYTES NFR BLD AUTO: 9.2 %
NEUTROPHILS # BLD AUTO: 3.56 K/UL
NEUTROPHILS NFR BLD AUTO: 62.5 %
PLATELET # BLD AUTO: 294 K/UL
POTASSIUM SERPL-SCNC: 4.6 MMOL/L
PROT SERPL-MCNC: 7.1 G/DL
RBC # BLD: 5.68 M/UL
RBC # FLD: 12.5 %
SODIUM SERPL-SCNC: 142 MMOL/L
THYROGLOB AB SERPL-ACNC: <20 IU/ML
THYROPEROXIDASE AB SERPL IA-ACNC: <10 IU/ML
WBC # FLD AUTO: 5.68 K/UL

## 2019-04-18 LAB — ANA SER IF-ACNC: NEGATIVE

## 2019-04-22 ENCOUNTER — APPOINTMENT (OUTPATIENT)
Dept: NEUROLOGY | Facility: CLINIC | Age: 59
End: 2019-04-22
Payer: COMMERCIAL

## 2019-04-22 VITALS
DIASTOLIC BLOOD PRESSURE: 85 MMHG | OXYGEN SATURATION: 95 % | WEIGHT: 188 LBS | HEIGHT: 71 IN | HEART RATE: 96 BPM | SYSTOLIC BLOOD PRESSURE: 147 MMHG | BODY MASS INDEX: 26.32 KG/M2

## 2019-04-22 DIAGNOSIS — I10 ESSENTIAL (PRIMARY) HYPERTENSION: ICD-10-CM

## 2019-04-22 DIAGNOSIS — A69.20 LYME DISEASE, UNSPECIFIED: ICD-10-CM

## 2019-04-22 DIAGNOSIS — F51.04 PSYCHOPHYSIOLOGIC INSOMNIA: ICD-10-CM

## 2019-04-22 LAB — VGKC AB SER-SCNC: 0 PMOL/L

## 2019-04-22 PROCEDURE — 99215 OFFICE O/P EST HI 40 MIN: CPT

## 2019-04-23 LAB
B BURGDOR AB SER-IMP: NEGATIVE
B BURGDOR IGG+IGM SER QL IB: NORMAL
B BURGDOR IGG+IGM SER QL: 0.04 INDEX

## 2019-04-25 LAB
B BURGDOR AB SER-IMP: NEGATIVE
B BURGDOR IGM PATRN SER IB-IMP: NEGATIVE
B BURGDOR18/20KD IGM SER QL IB: NORMAL
B BURGDOR18KD IGG SER QL IB: NORMAL
B BURGDOR23KD IGG SER QL IB: NORMAL
B BURGDOR23KD IGM SER QL IB: NORMAL
B BURGDOR28KD AB SER QL IB: NORMAL
B BURGDOR28KD IGG SER QL IB: NORMAL
B BURGDOR30KD AB SER QL IB: NORMAL
B BURGDOR30KD IGG SER QL IB: NORMAL
B BURGDOR31KD IGG SER QL IB: NORMAL
B BURGDOR31KD IGM SER QL IB: NORMAL
B BURGDOR39KD IGG SER QL IB: NORMAL
B BURGDOR39KD IGM SER QL IB: NORMAL
B BURGDOR41KD IGG SER QL IB: PRESENT
B BURGDOR41KD IGM SER QL IB: NORMAL
B BURGDOR45KD AB SER QL IB: NORMAL
B BURGDOR45KD IGG SER QL IB: NORMAL
B BURGDOR58KD AB SER QL IB: NORMAL
B BURGDOR58KD IGG SER QL IB: NORMAL
B BURGDOR66KD IGG SER QL IB: NORMAL
B BURGDOR66KD IGM SER QL IB: NORMAL
B BURGDOR93KD IGG SER QL IB: PRESENT
B BURGDOR93KD IGM SER QL IB: NORMAL
PARANEOPLASTIC AB PNL SER: NORMAL

## 2019-04-29 LAB — VGKC AB SER-SCNC: 0 PMOL/L

## 2019-05-01 LAB — PARANEOPLASTIC AB PNL SER: NORMAL

## 2019-05-06 LAB — IL2 SERPL-MCNC: NORMAL

## 2019-05-15 PROBLEM — I10 HYPERTENSION, UNSPECIFIED TYPE: Status: ACTIVE | Noted: 2018-06-14

## 2019-05-15 PROBLEM — A69.20 LYME DISEASE: Status: ACTIVE | Noted: 2017-07-07

## 2019-05-15 PROBLEM — F51.04 PSYCHOPHYSIOLOGICAL INSOMNIA: Status: ACTIVE | Noted: 2018-10-26

## 2019-05-16 NOTE — HISTORY OF PRESENT ILLNESS
[FreeTextEntry1] : Juventino returns for follow-up for VGKC LGI1 auto-antibody titer positive, last level drawn Aug 15, 2018, and was finally in the normal range, 4, from 121 in the past, but His VGKC titer finally returned in the normal range, at 4 in August.\par \par We had sent out tests last week, but they have not yet returned, and it is unclear if they were sent properly at all.  He also did not have Lyme tested so we will send for both.\par \par Milly mentions that he tends to not be able to finish anything.  He can start, but does not complete.  Dr. Marin is his PMD most of the time.\par Juventino gave me permission to talk to Dr. Cline.\par \par /85.\par \par Sleep Routine:\par Retires:  11pm\par Latency: pretty quickly.\par Arousals: not often, rarely.\par Awakens:  7-9am.  Feels refreshed.\par \par Sleep aids attempted/failed:\par [place]:\par \par He reports being still more edgy then he would like.\par BP a bit on the high side, but manageable.\par Once in a while, possible headache.\par \par \par Prior:\par The first infusion of rituximab occurred June 11, 2018, without incident, and was then completed in the infusion center 6/19/2018.\par Issues of being obstinate and very focused on what he wanted and would not deviate much from this.  His wife came in and was clearly fed up with him.  He would not budge.  She wanted him to have the blood work here, and he refused, so she said she would drive back home without him.  He said fine.  But later relented.\par \par His main issues are headaches, sleep and HTN, and wanted treatments for all.\par The headaches come and go- sharp pain in the head\par \par Dr. Cline is his PCP, and he was insistent that everything could be run through him.\par \par Prior had 3 courses of IVIg in [place], and seemed to stabilize, however, it stopped, and his wife was concerned that some paranoia was beginning to return.  He was more suspicious and seemed to have some odd behavior.  Today, he returns and feels he is pretty close to 100% and reports that he has no paranoia.\par \par He is taking a medication for anxiety, which helped 'take the edge off' a bit.  But there were others he thinks he did poorly on.\par \par Sleep has been very good.\par \par In the past, he was open about having paranoia and anxiety, that was unexplained.  He felt that people were watching or following him.  He had told his wife he thought someone might be coming to kill him.  His PMD started him on zoloft, but 3d later, he became 'insane', much worse, with some aggressiveness.  He presented to the [place] [facility] with his wife because he knew he needed help.  He would not follow advice, and was admitted involuntarily for 3d or so.  Haldol, seroquel, lorazepam among the medications used.  He felt it was a bad experience while in the hospital, but realizes that he felt better after leaving.\par \par The neurologist in the hospital had seen some dystonic movements, and sent labs to the [place], which then returned with VGKC autoantibody titer about 10x normal at 0.19 (n<0.02).\par \par There were no classic faciobrachial dystonic seizures.\par \par Prior to this, there was a tick bite, about March/April 2017, and a [place] rash around it.  He may have been on antibiotics for a few days from his PMD, but nothing else.\par \par There was a flu for about a week, with myalgias/arthalgias, also around the same time, though they cannot recall the temporal relation to the tick bite, but possibly before.\par CXR have been clear, both at [place] and through the [facility] [place]  program - he reports having had PTSD, with some flooding back during his illness.\par

## 2019-05-16 NOTE — CONSULT LETTER
[Dear  ___] : Dear  [unfilled], [FreeTextEntry1] : I had the pleasure of seeing our mutual patient in the office today.\par \par Attached is the noted for this visit.  Please feel free to reach out should there be any questions or concerns.\par \par Thanks once again for the collaboration.\par \par Sincerely,\par \par Pablo Dupree MD MSc\par Vice-Chair, Neurology \par

## 2019-05-16 NOTE — PHYSICAL EXAM
[FreeTextEntry1] : General:\par Constitutional:  Sitting comfortably in NAD.\par Psychiatric: well-groomed, mildly pressured and edgy, reports being concerned by thoughts that do not appear to be his own.  He would not elaborate on the voices.\par Ears, Nose, Throat: no abnormalities, mucus membranes moist\par Neck: supple\par Extremities: no edema, clubbing or cyanosis\par Skin: no rash or neuro-cutaneous signs \par \par Cognitive:\par Orientation, language, memory and knowledge screens intact.\par registration 4/4\par goat, cow, pig, chicken, dog, cat, mice, sheep\par [id number]\par recall 2/4, with prompting 4/4.\par \par Cranial Nerves:\par II: SUHAIL. III/IV/VI: EOM Full.  VII: Face appears symmetric VIII: Normal to screening\par IX/X: normal phonation  XI: Trapezius Symmetric  XII: Tongue midline\par Motor:\par Power: no pronator drift\par \par Narrow based gait

## 2019-05-16 NOTE — DISCUSSION/SUMMARY
[FreeTextEntry1] : Impression:\par 1) VGKC limbic encephalitis, most recent rituximab infusion 6/2018, so 10 months, and most recent titer was zero.  Improved mood and cognitive status, though still with some paranoia, and sometimes just difficult. \par 2) he will likely require repeat rituximab, but will await titer.  There may rarely still be VGKC still being produced in the brain, even with a zero serum titer.\par 3) paranoia and anxiety - reports being more aware, but also finding thoughts that are not his pop into his head.  Had not tried propranolol as of yet.\par 4) hypertension - open to trying propranolol again today.\par 5) headaches - improved\par 6) heavy metal screen - had asked for this in the past, and only could have serum screen performed - the 24h urine test would be best performed near home.\par \par Plan:\par 1) can consider propranolol 10-30mg bid for HTN and anxiety.\par 2) ok with small amounts of xanax for anxiety, may break the irritability as well.\par 3) notes to Dr. Cline and for his consideration propranolol, and to quetiapine or mirtazepine for sleep, in addition to possible heavy metal screen if he believes it is indicated.

## 2019-05-22 NOTE — H&P ADULT - PROBLEM SELECTOR PROBLEM 3
Pt verbalizes readiness for discharge. Pt ambulating with walker. Pt pain controlled with oral pain medication (norco). Pt met all current goals and outcomes. Pt verbalizes understanding of all discharge instructions, discharge medication reconciliation, and adverse signs/symptoms.    Patient demonstrates teach back education regarding new medication/prescriptions. Copy of medication education card provided. Patient verbalizes anticoagulation with aspirin to begin upon discharge per providers instructions.    Printed AVS summary provided. Pt transported home with . Belongings with patient. Prescriptions are being picked up by .     Need for prophylactic measure

## 2020-01-23 DIAGNOSIS — F22 DELUSIONAL DISORDERS: ICD-10-CM

## 2020-01-30 LAB
ALBUMIN SERPL ELPH-MCNC: 5.1 G/DL
ALP BLD-CCNC: 83 U/L
ALT SERPL-CCNC: 46 U/L
ANION GAP SERPL CALC-SCNC: 16 MMOL/L
AST SERPL-CCNC: 25 U/L
BASOPHILS # BLD AUTO: 0.04 K/UL
BASOPHILS NFR BLD AUTO: 0.6 %
BILIRUB SERPL-MCNC: 0.3 MG/DL
BUN SERPL-MCNC: 17 MG/DL
CALCIUM SERPL-MCNC: 10.4 MG/DL
CHLORIDE SERPL-SCNC: 102 MMOL/L
CO2 SERPL-SCNC: 26 MMOL/L
CREAT SERPL-MCNC: 1.2 MG/DL
CRP SERPL-MCNC: 0.17 MG/DL
EOSINOPHIL # BLD AUTO: 0.08 K/UL
EOSINOPHIL NFR BLD AUTO: 1.1 %
ERYTHROCYTE [SEDIMENTATION RATE] IN BLOOD BY WESTERGREN METHOD: 3 MM/HR
GLUCOSE SERPL-MCNC: 137 MG/DL
HCT VFR BLD CALC: 52 %
HGB BLD-MCNC: 17.1 G/DL
IMM GRANULOCYTES NFR BLD AUTO: 0.6 %
LYMPHOCYTES # BLD AUTO: 1.73 K/UL
LYMPHOCYTES NFR BLD AUTO: 23.8 %
MAN DIFF?: NORMAL
MCHC RBC-ENTMCNC: 29.1 PG
MCHC RBC-ENTMCNC: 32.9 GM/DL
MCV RBC AUTO: 88.4 FL
MONOCYTES # BLD AUTO: 0.52 K/UL
MONOCYTES NFR BLD AUTO: 7.2 %
NEUTROPHILS # BLD AUTO: 4.85 K/UL
NEUTROPHILS NFR BLD AUTO: 66.7 %
PLATELET # BLD AUTO: 316 K/UL
POTASSIUM SERPL-SCNC: 4.7 MMOL/L
PROT SERPL-MCNC: 7.5 G/DL
RBC # BLD: 5.88 M/UL
RBC # FLD: 12.6 %
SODIUM SERPL-SCNC: 144 MMOL/L
WBC # FLD AUTO: 7.26 K/UL

## 2020-02-03 LAB — VGKC AB SER-SCNC: 0 PMOL/L

## 2020-02-05 LAB
A-TUMOR NECROSIS FACT SERPL-MCNC: <5 PG/ML
IL10 SERPL-MCNC: <5 PG/ML
IL12 SERPL-MCNC: <5 PG/ML
IL13 SERPL-MCNC: <5 PG/ML
IL2 SERPL-MCNC: 748 PG/ML
IL2 SERPL-MCNC: <5 PG/ML
IL4 SERPL-MCNC: <5 PG/ML
IL6 SERPL-MCNC: <5 PG/ML
IL8 SERPL-MCNC: <5 PG/ML
INTERFERON GAMMA: <5 PG/ML
INTERLEUKIN 1 BETA: <5 PG/ML
INTERLEUKIN 17: <5 PG/ML
INTERLEUKIN 5: <5 PG/ML
PARANEOPLASTIC AB PNL SER: NORMAL

## 2020-02-12 ENCOUNTER — APPOINTMENT (OUTPATIENT)
Dept: NEUROLOGY | Facility: CLINIC | Age: 60
End: 2020-02-12
Payer: COMMERCIAL

## 2020-02-12 VITALS
TEMPERATURE: 97.6 F | DIASTOLIC BLOOD PRESSURE: 87 MMHG | HEART RATE: 96 BPM | OXYGEN SATURATION: 96 % | BODY MASS INDEX: 25.9 KG/M2 | WEIGHT: 185 LBS | SYSTOLIC BLOOD PRESSURE: 144 MMHG | HEIGHT: 71 IN

## 2020-02-12 PROCEDURE — 99214 OFFICE O/P EST MOD 30 MIN: CPT

## 2020-03-19 ENCOUNTER — APPOINTMENT (OUTPATIENT)
Dept: NEUROLOGY | Facility: CLINIC | Age: 60
End: 2020-03-19

## 2020-03-19 NOTE — DISCUSSION/SUMMARY
[FreeTextEntry1] : Impression:\par 1) VGKC limbic encephalitis, most recent rituximab cycle completed 6/2018, and titers from Aug 2018 at zero.  Improved mood and cognitive status, though still with some paranoia, and sometimes just difficult. \par 2) behavior and cognition unclear if returned to baseline, as patient refusing testing and barely with enough patience for today's visit, but also requesting help.\par 3) No longer reporting paranoia or voices.\par 4) hypertension - open to trying propranolol again today.\par 5) headaches - improved\par \par Plan:\par 1) vocational rehabilitation vs cognitive rehab vs using stimulants.  Pt prefers a non-medication/therapy solution, but will not wish to have neuropsychological testing and in the end does not wish to talk to someone.  His son convinced him to try vyvanse first.\par 2) ok with small amounts of xanax for anxiety, may break the irritability as well.\par 3) notes to Dr. Cline

## 2020-03-19 NOTE — PHYSICAL EXAM
[FreeTextEntry1] : General:\par Constitutional:  Sitting comfortably in NAD.\par Psychiatric: well-groomed, anxious to complete the evaluation edgy, requesting help but then reports not wanting any.  Denies extrinsic thoughts.\par Ears, Nose, Throat: no abnormalities, mucus membranes moist\par Neck: supple\par Extremities: no edema, clubbing or cyanosis\par Skin: no rash or neuro-cutaneous signs \par \par Cognitive:\par Orientation, language, memory and knowledge screens intact.\par \par Cranial Nerves:\par II: SUHAIL. III/IV/VI: EOM Full.  VII: Face appears symmetric VIII: Normal to screening\par IX/X: normal phonation  XI: Trapezius Symmetric  XII: Tongue midline\par Motor:\par Power: no pronator drift\par \par Narrow based gait

## 2020-03-19 NOTE — CONSULT LETTER
[Dear  ___] : Dear  [unfilled], [Courtesy Letter:] : I had the pleasure of seeing your patient, [unfilled], in my office today. [Please see my note below.] : Please see my note below. [Consult Closing:] : Thank you very much for allowing me to participate in the care of this patient.  If you have any questions, please do not hesitate to contact me. [Sincerely,] : Sincerely, [FreeTextEntry3] : Pablo Dupree MD MSc\par Vice-Chair, Neurology\par

## 2020-03-19 NOTE — REVIEW OF SYSTEMS
[FreeTextEntry1] : ROS intake form reviewed with patient, systems left blank were confirmed to be negative.  See attachment

## 2020-03-19 NOTE — HISTORY OF PRESENT ILLNESS
[FreeTextEntry1] : Juventino returns for follow-up for VGKC LGI1 auto-antibody titer positive, though his last 3 VGKC draws were 0, most recently Jan 29, 2020.\par \par His son Will  mentioned that he sees that his father have trouble finishing tasks, and wonders whether vyvanse could be could for him.  He himself is on adderall.\par Jeet Renteria also reports that his outbursts and impulsivity appear to be improved.\par His wife, Milly, had mentioned years ago that he tends to not be able to finish anything.  He can start, but does not complete.  This may be long-standing and now exacerbated following encephalitis.\par He reports being still more edgy then he would like.\par No headache.\par \par Sleeps well.\par Mood is irritable.\par \par Aug 15, 2018, VGKC finally in the normal range, 4, from 121 in the past\par   Dr. Marin is his PMD most of the time.\par Juventino gave me permission to talk to Dr. Cline.\par \par Sleep Routine:\par Retires:  11pm\par Latency: pretty quickly.\par Arousals: not often, rarely.\par Awakens:  7-9am.  Feels refreshed.\par \par \par Prior:\par The first infusion of rituximab occurred June 11, 2018, without incident, and was then completed in the infusion center 6/19/2018, almost 2 months ago.\par \par Today, he began being obstinate and very focused on what he wanted and would not deviate much from this.  His wife came in and was clearly fed up with him.  He would not budge.  She wanted him to have the blood work here, and he refused, so she said she would drive back home without him.  He said fine.  But later relented.\par \par His main issues are headaches, sleep and HTN, and wanted treatments for all.\par The headaches come and go- sharp pain in the head\par \par Dr. Cline is his PCP, and he was insistent that everything could be run through him.\par \par \par Prior:   3 courses of IVIg in [place], and seemed to stabilize, however, it stopped, and his wife was concerned that some paranoia was beginning to return.  He was more suspicious and seemed to have some odd behavior.  Today, he returns and feels he is pretty close to 100% and reports that he has no paranoia.\par \par He is taking a medication for anxiety, which helped 'take the edge off' a bit.  But there were others he thinks he did poorly on.\par \par Sleep has been very good.\par \par In the past, he was open about having paranoia and anxiety, that was unexplained.  He felt that people were watching or following him.  He had told his wife he thought someone might be coming to kill him.  His PMD started him on zoloft, but 3d later, he became 'insane', much worse, with some aggressiveness.  He presented to the [place] ER with his wife because he knew he needed help.  He would not follow advice, and was admitted involuntarily for 3d or so.  Haldol, seroquel, lorazepam among the medications used.  He felt it was a bad experience while in the hospital, but realizes that he felt better after leaving.\par \par The neurologist in the hospital had seen some dystonic movements, and sent labs to the [facility], which then returned with VGKC autoantibody titer about 10x normal at 0.19 (n<0.02).\par \par There were no classic faciobrachial dystonic seizures.\par \par Prior to this, there was a tick bite, about March/April 2017, and a [place] rash around it.  He may have been on antibiotics for a few days from his PMD, but nothing else.\par \par There was a flu for about a week, with myalgias/arthalgias, also around the same time, though they cannot recall the temporal relation to the tick bite, but possibly before.\par CXR have been clear, both at [place] and through the [facility] [place]  program - he reports having had PTSD, with some flooding back during his illness.\par

## 2020-07-30 RX ORDER — LISDEXAMFETAMINE DIMESYLATE 20 MG/1
20 CAPSULE ORAL
Qty: 60 | Refills: 0 | Status: DISCONTINUED | COMMUNITY
Start: 2020-02-12 | End: 2020-07-30

## 2020-08-25 ENCOUNTER — APPOINTMENT (OUTPATIENT)
Dept: NEUROLOGY | Facility: CLINIC | Age: 60
End: 2020-08-25

## 2020-10-26 ENCOUNTER — APPOINTMENT (OUTPATIENT)
Dept: NEUROLOGY | Facility: CLINIC | Age: 60
End: 2020-10-26
Payer: COMMERCIAL

## 2020-10-26 VITALS
HEART RATE: 65 BPM | HEIGHT: 71 IN | DIASTOLIC BLOOD PRESSURE: 78 MMHG | SYSTOLIC BLOOD PRESSURE: 137 MMHG | OXYGEN SATURATION: 98 % | WEIGHT: 184 LBS | BODY MASS INDEX: 25.76 KG/M2 | TEMPERATURE: 96.6 F

## 2020-10-26 PROCEDURE — 99072 ADDL SUPL MATRL&STAF TM PHE: CPT

## 2020-10-26 PROCEDURE — 99214 OFFICE O/P EST MOD 30 MIN: CPT

## 2020-10-26 RX ORDER — PROPRANOLOL HYDROCHLORIDE 10 MG/1
10 TABLET ORAL TWICE DAILY
Qty: 60 | Refills: 2 | Status: ACTIVE | COMMUNITY
Start: 2018-06-14

## 2020-10-26 NOTE — CONSULT LETTER
[Dear  ___] : Dear  [unfilled], [Courtesy Letter:] : I had the pleasure of seeing your patient, [unfilled], in my office today. [Please see my note below.] : Please see my note below. [Sincerely,] : Sincerely, [FreeTextEntry3] : Pablo Dupree MD MSc\par Vice-Chair, Neurology\par

## 2020-10-26 NOTE — DISCUSSION/SUMMARY
[FreeTextEntry1] : Impression:\par 1) VGKC limbic encephalitis, most recent rituximab infusion 6/2018.  VGKC serum titers most recently zero.  Local MDs set him up for repeat LP.  \par 2) Improved mood and cognitive status, though still with some paranoia, and sometimes just difficult. \par 3) hypertension - open to trying propranolol again today.\par 4) headaches - improved/remitted - taking propranolol.\par 5) cognition:  vyvanse seems to work\par \par Plan:\par 1) vocational rehabilitation vs cognitive rehab\par 2) consider trial of quetiapine for sleep and stabilization, which will work better than quetiapine\par 3) clonazepam PRN\par 4) notes to Dr. Cline\par

## 2020-10-26 NOTE — PHYSICAL EXAM
[FreeTextEntry1] : General:\par Constitutional:  Sitting comfortably in NAD.\par Psychiatric: well-groomed, affect a bit withdrawn - hoodie over head.  Some comments but not talking very much and not answer questions fully.\par Ears, Nose, Throat: no abnormalities, mucus membranes moist\par Neck: supple\par Extremities: no edema, clubbing or cyanosis\par Skin: no rash or neuro-cutaneous signs \par \par Cognitive:\par Orientation, language, memory and knowledge screens intact.\par \par Cranial Nerves:\par II: SUHAIL. III/IV/VI: EOM Full.  VII: Face appears symmetric VIII: Normal to screening\par IX/X: normal phonation  XI: Trapezius Symmetric  XII: Tongue midline\par Motor:\par Power: no pronator drift\par \par Narrow based gait\par

## 2020-10-26 NOTE — HISTORY OF PRESENT ILLNESS
[FreeTextEntry1] : Juventino returns for follow-up for [facility] LGI1 auto-antibody titer positive, though his last 3 [facility] draws were 0, most recently Jan 29, 2020.\par \par His son Will  mentioned that he sees that his father have trouble finishing tasks, and wonders whether vyvanse could be could for him.  He himself is on adderall.\par \par He did take some vyvanse at times - and he becomes productive and generally in a decent mood.\par Unclear if there is any rebound.\par His family ensure he is now taking it M-F, skipping weekends.\par Sometimes his mood explodes, not just rebound but also when not taking any medications at all.\par Family reports he could use some evening out of his mood though Will  also reports that his outbursts and impulsivity appear to be improved.\par He reports being still more edgy then he would like, but cannot find quite the right word for it.\par \par His wife, Milly, had mentioned years ago that he tends to not be able to finish anything.  He can start, but does not complete.  This may be long-standing and now exacerbated following encephalitis.\par No headache.\par Sleeps poorly per his son, but he does not really answer.\par Exercising seems to help with his tension.\par \par Due for repeat LP locally.  CBT being set-up.\par \par \par Aug 15, 2018, [facility] finally in the normal range, 4, from 121 in the past\par   Dr. Marin is his PMD most of the time.\par Juventino gave me permission to talk to Dr. Cline.\par \par Sleep Routine:\par Retires:  11pm\par Latency: pretty quickly.\par Arousals: not often, rarely.\par Awakens:  7-9am.  Feels refreshed.\par \par \par Prior:\par The first infusion of rituximab occurred June 11, 2018, without incident, and was then completed in the infusion center 6/19/2018, almost 2 months ago.\par \par Today, he began being obstinate and very focused on what he wanted and would not deviate much from this.  His wife came in and was clearly fed up with him.  He would not budge.  She wanted him to have the blood work here, and he refused, so she said she would drive back home without him.  He said fine.  But later relented.\par \par His main issues are headaches, sleep and HTN, and wanted treatments for all.\par The headaches come and go- sharp pain in the head\par \par Dr. Cline is his PCP, and he was insistent that everything could be run through him.\par \par \par Prior:   3 courses of IVIg in [place], and seemed to stabilize, however, it stopped, and his wife was concerned that some paranoia was beginning to return.  He was more suspicious and seemed to have some odd behavior.  Today, he returns and feels he is pretty close to 100% and reports that he has no paranoia.\par \par He is taking a medication for anxiety, which helped 'take the edge off' a bit.  But there were others he thinks he did poorly on.\par \par Sleep has been very good.\par \par In the past, he was open about having paranoia and anxiety, that was unexplained.  He felt that people were watching or following him.  He had told his wife he thought someone might be coming to kill him.  His PMD started him on zoloft, but 3d later, he became 'insane', much worse, with some aggressiveness.  He presented to the [facility] with his wife because he knew he needed help.  He would not follow advice, and was admitted involuntarily for 3d or so.  Haldol, seroquel, lorazepam among the medications used.  He felt it was a bad experience while in the hospital, but realizes that he felt better after leaving.\par \par The neurologist in the hospital had seen some dystonic movements, and sent labs to the [facility], which then returned with [facility] autoantibody titer about 10x normal at 0.19 (n<0.02).\par \par There were no classic faciobrachial dystonic seizures.\par \par Prior to this, there was a tick bite, about March/April 2017, and a [place] rash around it.  He may have been on antibiotics for a few days from his PMD, but nothing else.\par \par There was a flu for about a week, with myalgias/arthalgias, also around the same time, though they cannot recall the temporal relation to the tick bite, but possibly before.\par CXR have been clear, both at [facility] and through the [facility]  program - he reports having had PTSD, with some flooding back during his illness.\par

## 2020-11-02 RX ORDER — LISDEXAMFETAMINE DIMESYLATE 20 MG/1
20 CAPSULE ORAL
Qty: 60 | Refills: 0 | Status: COMPLETED | COMMUNITY
Start: 2020-02-12 | End: 2020-11-02

## 2020-11-02 RX ORDER — LISDEXAMFETAMINE DIMESYLATE 20 MG/1
20 CAPSULE ORAL
Qty: 60 | Refills: 0 | Status: DISCONTINUED | COMMUNITY
Start: 2020-02-12 | End: 2020-11-02

## 2021-02-08 ENCOUNTER — RX RENEWAL (OUTPATIENT)
Age: 61
End: 2021-02-08

## 2021-06-03 ENCOUNTER — APPOINTMENT (OUTPATIENT)
Dept: NEUROLOGY | Facility: CLINIC | Age: 61
End: 2021-06-03

## 2021-08-09 ENCOUNTER — APPOINTMENT (OUTPATIENT)
Dept: NEUROLOGY | Facility: CLINIC | Age: 61
End: 2021-08-09
Payer: COMMERCIAL

## 2021-08-09 DIAGNOSIS — R11.0 NAUSEA: ICD-10-CM

## 2021-08-09 DIAGNOSIS — F07.89 UNSPECIFIED MENTAL DISORDER DUE TO KNOWN PHYSIOLOGICAL CONDITION: ICD-10-CM

## 2021-08-09 DIAGNOSIS — F09 UNSPECIFIED MENTAL DISORDER DUE TO KNOWN PHYSIOLOGICAL CONDITION: ICD-10-CM

## 2021-08-09 DIAGNOSIS — G04.90 ENCEPHALITIS AND ENCEPHALOMYELITIS, UNSPECIFIED: ICD-10-CM

## 2021-08-09 PROCEDURE — 99213 OFFICE O/P EST LOW 20 MIN: CPT | Mod: 95

## 2021-08-09 RX ORDER — ONDANSETRON 4 MG/1
4 TABLET, ORALLY DISINTEGRATING ORAL
Qty: 15 | Refills: 0 | Status: ACTIVE | COMMUNITY
Start: 2021-08-09 | End: 1900-01-01

## 2021-08-09 NOTE — CONSULT LETTER
[Dear  ___] : Dear  [unfilled], [Courtesy Letter:] : I had the pleasure of seeing your patient, [unfilled], in my office today. [Please see my note below.] : Please see my note below. [FreeTextEntry3] : Pablo Dupree MD MSc\par Vice-Chair, Neurology \par

## 2021-08-09 NOTE — REASON FOR VISIT
[Home] : at home, [unfilled] , at the time of the visit. [Medical Office: ([facility])___] : at the medical office located in  [Verbal consent obtained from patient] : the patient, [unfilled] [Follow-Up: _____] : a [unfilled] follow-up visit

## 2021-08-09 NOTE — PHYSICAL EXAM
[FreeTextEntry1] : General:\par Constitutional:  Sitting comfortably in NAD.\par Psychiatric: well-groomed, calm, perhaps with psychomotor slowing.\par \par Cognitive:\par Orientation, language, memory and knowledge screens intact.\par \par VII: Face appears symmetric VIII: Normal to screening\par IX/X: normal phonation  \par

## 2021-08-09 NOTE — DISCUSSION/SUMMARY
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  Will need to watch.  Atomoxetine suggested for motivation and mood and ADHD tendencies, but may have minor effect on BP.\par \par Plan:\par 1) vocational rehabilitation vs cognitive rehab\par 2) trial of atomoxetine, he did not like vyvanse in past, so will attempt non-stimulants.  He agrees to try for 3 months, which is about the time it will take to uptitrate and see effect.  Goal dose is 40 to 60mg/d.\par 3) notes to Dr. Cline\par

## 2021-08-09 NOTE — HISTORY OF PRESENT ILLNESS
[FreeTextEntry1] : Juventino returns for follow-up for [facility] LGI1 auto-antibody titer positive, though his last 3 [facility] draws were 0, most recently Jan 29, 2020.\par \par Will was in a better and more calm mood today and open to discussion.  Perhaps abulic to an extent, but patient's wife Milly feeling he is depressed.\par \par The [facility] level via [facility] was negative\par TPO neg Covid Ab negative\par TNFa slightly elevated 14.4 (0-13)\par \par \par He is not completing tasks and the family notices this.\par At one prior visit His son Jeet Renteria also noted the trouble finishing tasks, and we used some vyvanse, which apparently resulted in some improved productivity and generally in a decent mood.  Unclear if there is any rebound.  His family ensure he is now taking it M-F, skipping weekends.\par Sometimes his mood explodes, not just rebound but also when not taking any medications at all.\par \par He reports today that he did not really like being on it.  Back then he did report feeling more edgy then he would like, but cannot find quite the right word for it.\par \par Reports no H/A no nausea.\par \par Prior:\par Aug 15, 2018, [facility] finally in the normal range, 4, from 121 in the past\par   Dr. Marin is his PMD most of the time.\par First rituximab June 11, 2018, then second in the infusion center 6/19/2018\par  3 courses of IVIg in [place], and seemed to stabilize, however, it stopped, and his wife was concerned that some paranoia was beginning to return.  He was more suspicious and seemed to have some odd behavior.  Today, he returns and feels he is pretty close to 100% and reports that he has no paranoia.\par The neurologist in the hospital had seen some dystonic movements, and sent labs to the [facility], which then returned with [facility] autoantibody titer about 10x normal at 0.19 (n<0.02).\par

## 2021-09-07 ENCOUNTER — RX RENEWAL (OUTPATIENT)
Age: 61
End: 2021-09-07

## 2021-09-07 RX ORDER — ATOMOXETINE 10 MG/1
10 CAPSULE ORAL
Qty: 90 | Refills: 1 | Status: ACTIVE | COMMUNITY
Start: 2021-08-09 | End: 1900-01-01

## 2022-01-07 ENCOUNTER — APPOINTMENT (OUTPATIENT)
Dept: NEUROLOGY | Facility: CLINIC | Age: 62
End: 2022-01-07

## 2022-02-11 ENCOUNTER — NON-APPOINTMENT (OUTPATIENT)
Age: 62
End: 2022-02-11

## 2024-08-21 NOTE — DIETITIAN INITIAL EVALUATION ADULT. - PT NOT SOURCE
Pt returned phone call and would like to be scheduled for 8/29 at 3:30 for his Np appt with Dr.Suarez Schneider   poor historian